# Patient Record
Sex: MALE | Race: WHITE | Employment: OTHER | URBAN - METROPOLITAN AREA
[De-identification: names, ages, dates, MRNs, and addresses within clinical notes are randomized per-mention and may not be internally consistent; named-entity substitution may affect disease eponyms.]

---

## 2017-01-04 ENCOUNTER — OFFICE VISIT (OUTPATIENT)
Dept: FAMILY MEDICINE CLINIC | Age: 72
End: 2017-01-04

## 2017-01-04 VITALS
SYSTOLIC BLOOD PRESSURE: 133 MMHG | DIASTOLIC BLOOD PRESSURE: 85 MMHG | HEIGHT: 73 IN | WEIGHT: 216 LBS | BODY MASS INDEX: 28.63 KG/M2 | OXYGEN SATURATION: 95 % | HEART RATE: 107 BPM | RESPIRATION RATE: 18 BRPM

## 2017-01-04 DIAGNOSIS — J01.20 ACUTE NON-RECURRENT ETHMOIDAL SINUSITIS: ICD-10-CM

## 2017-01-04 DIAGNOSIS — R73.02 IGT (IMPAIRED GLUCOSE TOLERANCE): ICD-10-CM

## 2017-01-04 DIAGNOSIS — J06.9 UPPER RESPIRATORY TRACT INFECTION, UNSPECIFIED TYPE: Primary | ICD-10-CM

## 2017-01-04 DIAGNOSIS — J30.9 ALLERGIC RHINITIS, UNSPECIFIED ALLERGIC RHINITIS TRIGGER, UNSPECIFIED RHINITIS SEASONALITY: ICD-10-CM

## 2017-01-04 LAB — HBA1C MFR BLD HPLC: 7.1 %

## 2017-01-04 RX ORDER — FLUTICASONE PROPIONATE 50 MCG
2 SPRAY, SUSPENSION (ML) NASAL DAILY
Qty: 1 BOTTLE | Refills: 2 | Status: SHIPPED | OUTPATIENT
Start: 2017-01-04 | End: 2017-11-02 | Stop reason: SDUPTHER

## 2017-01-04 RX ORDER — AMOXICILLIN AND CLAVULANATE POTASSIUM 875; 125 MG/1; MG/1
1 TABLET, FILM COATED ORAL 2 TIMES DAILY
Qty: 20 TAB | Refills: 0 | Status: SHIPPED | OUTPATIENT
Start: 2017-01-04 | End: 2018-12-31 | Stop reason: SDUPTHER

## 2017-01-04 NOTE — MR AVS SNAPSHOT
Visit Information Date & Time Provider Department Dept. Phone Encounter #  
 1/4/2017  1:15 PM Yfn Beltran, [address] [address] [phone] [id number] Follow-up Instructions Return if symptoms worsen or fail to improve. Upcoming Health Maintenance Date Due DTaP/Tdap/Td series (1 - Tdap) 9/28/1966 FOBT Q 1 YEAR AGE 50-75 7/17/2019* MEDICARE YEARLY EXAM 1/13/2017 GLAUCOMA SCREENING Q2Y 11/13/2017 *Topic was postponed. The date shown is not the original due date. Allergies as of 1/4/2017  Review Complete On: 1/4/2017 By: Yfn Beltran NP No Known Allergies Current Immunizations  Reviewed on 10/1/2013 Name Date Influenza High Dose Vaccine PF 10/1/2013, 1/11/2013 Influenza Vaccine Split 1/9/2012 Pneumococcal Conjugate (PCV-13) 1/13/2016 Pneumococcal Vaccine (Unspecified Type) 1/9/2012 Zoster Vaccine, Live 1/1/2014 Not reviewed this visit You Were Diagnosed With   
  
 Codes Comments Upper respiratory tract infection, unspecified type    -  Primary ICD-10-CM: J06.9 ICD-9-CM: 465.9 Acute non-recurrent ethmoidal sinusitis     ICD-10-CM: J01.20 ICD-9-CM: 461.2 Allergic rhinitis, unspecified allergic rhinitis trigger, unspecified rhinitis seasonality     ICD-10-CM: J30.9 ICD-9-CM: 477.9 Vitals BP Pulse Resp Height(growth percentile) Weight(growth percentile) SpO2  
 133/85 (BP 1 Location: Left arm, BP Patient Position: Sitting) (!) 107 18 6' 1\" (1.854 m) 216 lb (98 kg) 95% BMI Smoking Status 28.5 kg/m2 Former Smoker BMI and BSA Data Body Mass Index Body Surface Area 28.5 kg/m 2 2.25 m 2 Preferred Pharmacy Pharmacy Name Phone [facility] [address] [facility] [address] [facility] [phone] Your Updated Medication List  
  
   
This list is accurate as of: 1/4/17  2:37 PM.  Always use your most recent med list.  
  
  
  
  
 amLODIPine 5 mg tablet Commonly known as:  Marikaerika Barrios Take 1 Tab by mouth daily. amoxicillin-clavulanate 875-125 mg per tablet Commonly known as:  AUGMENTIN Take 1 Tab by mouth two (2) times a day for 10 days. aspirin 81 mg tablet Take 81 mg by mouth.  
  
 cilostazol 100 mg tablet Commonly known as:  PLETAL Take 1 Tab by mouth Before breakfast and dinner. fluticasone 50 mcg/actuation nasal spray Commonly known as:  Michaelyn Vishnu 2 Sprays by Both Nostrils route daily. lisinopril-hydroCHLOROthiazide 20-12.5 mg per tablet Commonly known as:  Mecca Edinger Take 1 Tab by mouth daily. * omega-3 fatty acids-vitamin e 1,000 mg Cap Take 1 Cap by mouth. * FISH OIL 1,000 mg Cap Generic drug:  omega-3 fatty acids-vitamin e Take 1 Cap by mouth.  
  
 potassium 99 mg tablet Take  by mouth.  
  
 pravastatin 10 mg tablet Commonly known as:  PRAVACHOL Take 1 Tab by mouth daily. * Notice: This list has 2 medication(s) that are the same as other medications prescribed for you. Read the directions carefully, and ask your doctor or other care provider to review them with you. Prescriptions Sent to Pharmacy Refills  
 amoxicillin-clavulanate (AUGMENTIN) 875-125 mg per tablet 0 Sig: Take 1 Tab by mouth two (2) times a day for 10 days. Class: Normal  
 Pharmacy: [address], [facility] [address] [facility] Ph #: [phone] Route: Oral  
 fluticasone (FLONASE) 50 mcg/actuation nasal spray 2 Si Sprays by Both Nostrils route daily. Class: Normal  
 Pharmacy: [address], [address] [facility] Ph #: [phone] Route: Both Nostrils Follow-up Instructions Return if symptoms worsen or fail to improve. Patient Instructions Also advised to use OTC nasal saline 2 sprays each nostril 2-3 times per day to assist with eustachian tube draining. Managing Your Allergies: Care Instructions Your Care Instructions Managing your allergies is an important part of staying healthy. Your doctor will help you find out what may be causing the allergies. Common causes of allergy symptoms are house dust and dust mites, animal dander, mold, and pollen. As soon as you know what triggers your symptoms, try to reduce your exposure to your triggers. This can help prevent allergy symptoms, asthma, and other health problems. Ask your doctor about allergy medicine or immunotherapy. These treatments may help reduce or prevent allergy symptoms. Follow-up care is a key part of your treatment and safety. Be sure to make and go to all appointments, and call your doctor if you are having problems. It's also a good idea to know your test results and keep a list of the medicines you take. How can you care for yourself at home? · If you think that dust or dust mites are causing your allergies: 
¨ Wash sheets, pillowcases, and other bedding every week in hot water. ¨ Use airtight, dust-proof covers for pillows, duvets, and mattresses. Avoid plastic covers, because they tend to tear quickly and do not \"breathe. \" Wash according to the instructions. ¨ Remove extra blankets and pillows that you don't need. ¨ Use blankets that are machine-washable. ¨ Don't use home humidifiers. They can help mites live longer. · Use air-conditioning. Change or clean all filters every month. Keep windows closed. Use high-efficiency air filters. Don't use window or attic fans, which draw dust into the air. · If you're allergic to pet dander, keep pets outside or, at the very least, out of your bedroom. Old carpet and cloth-covered furniture can hold a lot of animal dander. You may need to replace them. · Look for signs of cockroaches. Use cockroach baits to get rid of them. Then clean your home well.  
· If you're allergic to mold, don't keep indoor plants, because molds can grow in soil. Get rid of furniture, rugs, and drapes that smell musty. Check for mold in the bathroom. · If you're allergic to pollen, stay inside when pollen counts are high. · Don't smoke or let anyone else smoke in your house. Don't use fireplaces or wood-burning stoves. Avoid paint fumes, perfumes, and other strong odors. When should you call for help? Give an epinephrine shot if: 
· You think you are having a severe allergic reaction. · You have symptoms in more than one body area, such as mild nausea and an itchy mouth. After giving an epinephrine shot call 911, even if you feel better. Call 911 if: 
· You have symptoms of a severe allergic reaction. These may include: 
¨ Sudden raised, red areas (hives) all over your body. ¨ Swelling of the throat, mouth, lips, or tongue. ¨ Trouble breathing. ¨ Passing out (losing consciousness). Or you may feel very lightheaded or suddenly feel weak, confused, or restless. · You have been given an epinephrine shot, even if you feel better. Call your doctor now or seek immediate medical care if: 
· You have symptoms of an allergic reaction, such as: ¨ A rash or hives (raised, red areas on the skin). ¨ Itching. ¨ Swelling. ¨ Belly pain, nausea, or vomiting. Watch closely for changes in your health, and be sure to contact your doctor if: 
· Your allergies get worse. · You need help controlling your allergies. · You have questions about allergy testing. · You do not get better as expected. Where can you learn more? Go to http://clayton-tracey.info/. Enter L249 in the search box to learn more about \"Managing Your Allergies: Care Instructions. \" Current as of: February 12, 2016 Content Version: 11.1 © 5632-6445 [employer]. Care instructions adapted under license by [employer] (which disclaims liability or warranty for this information).  If you have questions about a medical condition or this instruction, always ask your healthcare professional. [address] any warranty or liability for your use of this information. Saline Nasal Washes: Care Instructions Your Care Instructions Saline nasal washes help keep the nasal passages open by washing out thick or dried mucus. This simple remedy can help relieve symptoms of allergies, sinusitis, and colds. It also can make the nose feel more comfortable by keeping the mucous membranes moist. You may notice a little burning sensation in your nose the first few times you use the solution, but this usually gets better in a few days. Follow-up care is a key part of your treatment and safety. Be sure to make and go to all appointments, and call your doctor if you are having problems. It's also a good idea to know your test results and keep a list of the medicines you take. How can you care for yourself at home? · You can buy premixed saline solution in a squeeze bottle or other sinus rinse products at a drugstore. Read and follow the instructions on the label. · You also can make your own saline solution by adding 1 teaspoon of salt and 1 teaspoon of baking soda to 2 cups of distilled water. · If you use a homemade solution, pour a small amount into a clean bowl. Using a rubber bulb syringe, squeeze the syringe and place the tip in the salt water. Pull a small amount of the salt water into the syringe by relaxing your hand. · Sit down with your head tilted slightly back. Do not lie down. Put the tip of the bulb syringe or the squeeze bottle a little way into one of your nostrils. Gently drip or squirt a few drops into the nostril. Repeat with the other nostril. Some sneezing and gagging are normal at first. 
· Gently blow your nose. · Wipe the syringe or bottle tip clean after each use. · Repeat this 2 or 3 times a day. · Use nasal washes gently if you have nosebleeds often. When should you call for help? Watch closely for changes in your health, and be sure to contact your doctor if: 
· You often get nosebleeds. · You have problems doing the nasal washes. Where can you learn more? Go to http://clayton-tracey.info/. Enter [id number] in the search box to learn more about \"Saline Nasal Washes: Care Instructions. \" Current as of: July 29, 2016 Content Version: 11.1 © 7530-9657 [employer]. Care instructions adapted under license by [employer] (which disclaims liability or warranty for this information). If you have questions about a medical condition or this instruction, always ask your healthcare professional. [address] [address] any warranty or liability for your use of this information. Sinusitis: Care Instructions Your Care Instructions Sinusitis is an infection of the lining of the sinus cavities in your head. Sinusitis often follows a cold. It causes pain and pressure in your head and face. In most cases, sinusitis gets better on its own in 1 to 2 weeks. But some mild symptoms may last for several weeks. Sometimes antibiotics are needed. Follow-up care is a key part of your treatment and safety. Be sure to make and go to all appointments, and call your doctor if you are having problems. It's also a good idea to know your test results and keep a list of the medicines you take. How can you care for yourself at home? · Take an over-the-counter pain medicine, such as acetaminophen (Tylenol), ibuprofen (Advil, Motrin), or naproxen (Aleve). Read and follow all instructions on the label. · If the doctor prescribed antibiotics, take them as directed. Do not stop taking them just because you feel better. You need to take the full course of antibiotics. · Be careful when taking over-the-counter cold or flu medicines and Tylenol at the same time.  Many of these medicines have acetaminophen, which is Tylenol. Read the labels to make sure that you are not taking more than the recommended dose. Too much acetaminophen (Tylenol) can be harmful. · Breathe warm, moist air from a steamy shower, a hot bath, or a sink filled with hot water. Avoid cold, dry air. Using a humidifier in your home may help. Follow the directions for cleaning the machine. · Use saline (saltwater) nasal washes to help keep your nasal passages open and wash out mucus and bacteria. You can buy saline nose drops at a grocery store or [employer]. Or you can make your own at home by adding 1 teaspoon of salt and 1 teaspoon of baking soda to 2 cups of distilled water. If you make your own, fill a bulb syringe with the solution, insert the tip into your nostril, and squeeze gently. Cristina Spindle your nose. · Put a hot, wet towel or a warm gel pack on your face 3 or 4 times a day for 5 to 10 minutes each time. · Try a decongestant nasal spray like oxymetazoline (Afrin). Do not use it for more than 3 days in a row. Using it for more than 3 days can make your congestion worse. When should you call for help? Call your doctor now or seek immediate medical care if: 
· You have new or worse swelling or redness in your face or around your eyes. · You have a new or higher fever. Watch closely for changes in your health, and be sure to contact your doctor if: 
· You have new or worse facial pain. · The mucus from your nose becomes thicker (like pus) or has new blood in it. · You are not getting better as expected. Where can you learn more? Go to http://clayton-tracey.info/. Enter W446 in the search box to learn more about \"Sinusitis: Care Instructions. \" Current as of: July 29, 2016 Content Version: 11.1 © 8742-3919 [employer]. Care instructions adapted under license by [employer] (which disclaims liability or warranty for this information).  If you have questions about a medical condition or this instruction, always ask your healthcare professional. [address] [address] any warranty or liability for your use of this information. Introducing hospitals & HEALTH SERVICES! Warren Quintero introduces [employer] patient portal. Now you can access parts of your medical record, email your doctor's office, and request medication refills online. 1. In your internet browser, go to [email or web address] 2. Click on the First Time User? Click Here link in the Sign In box. You will see the New Member Sign Up page. 3. Enter your [employer] Access Code exactly as it appears below. You will not need to use this code after youve completed the sign-up process. If you do not sign up before the expiration date, you must request a new code. · [employer] Access Code: [email or web address] Expires: 4/4/2017  2:35 PM 
 
4. Enter the last four digits of your Social Security Number (xxxx) and Date of Birth (mm/dd/yyyy) as indicated and click Submit. You will be taken to the next sign-up page. 5. Create a [employer] ID. This will be your [employer] login ID and cannot be changed, so think of one that is secure and easy to remember. 6. Create a [employer] password. You can change your password at any time. 7. Enter your Password Reset Question and Answer. This can be used at a later time if you forget your password. 8. Enter your e-mail address. You will receive e-mail notification when new information is available in [address]. 9. Click Sign Up. You can now view and download portions of your medical record. 10. Click the Download Summary menu link to download a portable copy of your medical information. If you have questions, please visit the Frequently Asked Questions section of the [employer] website. Remember, [employer] is NOT to be used for urgent needs. For medical emergencies, dial 911. Now available from your iPhone and Android! Please provide this summary of care documentation to your next provider. Your primary care clinician is listed as [address] If you have any questions after today's visit, please call [phone].

## 2017-01-04 NOTE — PATIENT INSTRUCTIONS
Also advised to use OTC nasal saline 2 sprays each nostril 2-3 times per day to assist with eustachian tube draining. Managing Your Allergies: Care Instructions  Your Care Instructions  Managing your allergies is an important part of staying healthy. Your doctor will help you find out what may be causing the allergies. Common causes of allergy symptoms are house dust and dust mites, animal dander, mold, and pollen. As soon as you know what triggers your symptoms, try to reduce your exposure to your triggers. This can help prevent allergy symptoms, asthma, and other health problems. Ask your doctor about allergy medicine or immunotherapy. These treatments may help reduce or prevent allergy symptoms. Follow-up care is a key part of your treatment and safety. Be sure to make and go to all appointments, and call your doctor if you are having problems. It's also a good idea to know your test results and keep a list of the medicines you take. How can you care for yourself at home? · If you think that dust or dust mites are causing your allergies:  ¨ Wash sheets, pillowcases, and other bedding every week in hot water. ¨ Use airtight, dust-proof covers for pillows, duvets, and mattresses. Avoid plastic covers, because they tend to tear quickly and do not \"breathe. \" Wash according to the instructions. ¨ Remove extra blankets and pillows that you don't need. ¨ Use blankets that are machine-washable. ¨ Don't use home humidifiers. They can help mites live longer. · Use air-conditioning. Change or clean all filters every month. Keep windows closed. Use high-efficiency air filters. Don't use window or attic fans, which draw dust into the air. · If you're allergic to pet dander, keep pets outside or, at the very least, out of your bedroom. Old carpet and cloth-covered furniture can hold a lot of animal dander. You may need to replace them. · Look for signs of cockroaches. Use cockroach baits to get rid of them. Then clean your home well. · If you're allergic to mold, don't keep indoor plants, because molds can grow in soil. Get rid of furniture, rugs, and drapes that smell musty. Check for mold in the bathroom. · If you're allergic to pollen, stay inside when pollen counts are high. · Don't smoke or let anyone else smoke in your house. Don't use fireplaces or wood-burning stoves. Avoid paint fumes, perfumes, and other strong odors. When should you call for help? Give an epinephrine shot if:  · You think you are having a severe allergic reaction. · You have symptoms in more than one body area, such as mild nausea and an itchy mouth. After giving an epinephrine shot call 911, even if you feel better. Call 911 if:  · You have symptoms of a severe allergic reaction. These may include:  ¨ Sudden raised, red areas (hives) all over your body. ¨ Swelling of the throat, mouth, lips, or tongue. ¨ Trouble breathing. ¨ Passing out (losing consciousness). Or you may feel very lightheaded or suddenly feel weak, confused, or restless. · You have been given an epinephrine shot, even if you feel better. Call your doctor now or seek immediate medical care if:  · You have symptoms of an allergic reaction, such as:  ¨ A rash or hives (raised, red areas on the skin). ¨ Itching. ¨ Swelling. ¨ Belly pain, nausea, or vomiting. Watch closely for changes in your health, and be sure to contact your doctor if:  · Your allergies get worse. · You need help controlling your allergies. · You have questions about allergy testing. · You do not get better as expected. Where can you learn more? Go to http://clayton-tracey.info/. Enter L249 in the search box to learn more about \"Managing Your Allergies: Care Instructions. \"  Current as of: February 12, 2016  Content Version: 11.1  © 6527-4085 [employer].  Care instructions adapted under license by [employer] (which disclaims liability or warranty for this information). If you have questions about a medical condition or this instruction, always ask your healthcare professional. [address] any warranty or liability for your use of this information. Saline Nasal Washes: Care Instructions  Your Care Instructions  Saline nasal washes help keep the nasal passages open by washing out thick or dried mucus. This simple remedy can help relieve symptoms of allergies, sinusitis, and colds. It also can make the nose feel more comfortable by keeping the mucous membranes moist. You may notice a little burning sensation in your nose the first few times you use the solution, but this usually gets better in a few days. Follow-up care is a key part of your treatment and safety. Be sure to make and go to all appointments, and call your doctor if you are having problems. It's also a good idea to know your test results and keep a list of the medicines you take. How can you care for yourself at home? · You can buy premixed saline solution in a squeeze bottle or other sinus rinse products at a drugstore. Read and follow the instructions on the label. · You also can make your own saline solution by adding 1 teaspoon of salt and 1 teaspoon of baking soda to 2 cups of distilled water. · If you use a homemade solution, pour a small amount into a clean bowl. Using a rubber bulb syringe, squeeze the syringe and place the tip in the salt water. Pull a small amount of the salt water into the syringe by relaxing your hand. · Sit down with your head tilted slightly back. Do not lie down. Put the tip of the bulb syringe or the squeeze bottle a little way into one of your nostrils. Gently drip or squirt a few drops into the nostril. Repeat with the other nostril. Some sneezing and gagging are normal at first.  · Gently blow your nose. · Wipe the syringe or bottle tip clean after each use. · Repeat this 2 or 3 times a day.   · Use nasal washes gently if you have nosebleeds often. When should you call for help? Watch closely for changes in your health, and be sure to contact your doctor if:  · You often get nosebleeds. · You have problems doing the nasal washes. Where can you learn more? Go to http://clayton-tracey.info/. Enter [id number] in the search box to learn more about \"Saline Nasal Washes: Care Instructions. \"  Current as of: July 29, 2016  Content Version: 11.1  © 6813-5848 [employer]. Care instructions adapted under license by [employer] (which disclaims liability or warranty for this information). If you have questions about a medical condition or this instruction, always ask your healthcare professional. [address] any warranty or liability for your use of this information. Sinusitis: Care Instructions  Your Care Instructions    Sinusitis is an infection of the lining of the sinus cavities in your head. Sinusitis often follows a cold. It causes pain and pressure in your head and face. In most cases, sinusitis gets better on its own in 1 to 2 weeks. But some mild symptoms may last for several weeks. Sometimes antibiotics are needed. Follow-up care is a key part of your treatment and safety. Be sure to make and go to all appointments, and call your doctor if you are having problems. It's also a good idea to know your test results and keep a list of the medicines you take. How can you care for yourself at home? · Take an over-the-counter pain medicine, such as acetaminophen (Tylenol), ibuprofen (Advil, Motrin), or naproxen (Aleve). Read and follow all instructions on the label. · If the doctor prescribed antibiotics, take them as directed. Do not stop taking them just because you feel better. You need to take the full course of antibiotics. · Be careful when taking over-the-counter cold or flu medicines and Tylenol at the same time.  Many of these medicines have acetaminophen, which is Tylenol. Read the labels to make sure that you are not taking more than the recommended dose. Too much acetaminophen (Tylenol) can be harmful. · Breathe warm, moist air from a steamy shower, a hot bath, or a sink filled with hot water. Avoid cold, dry air. Using a humidifier in your home may help. Follow the directions for cleaning the machine. · Use saline (saltwater) nasal washes to help keep your nasal passages open and wash out mucus and bacteria. You can buy saline nose drops at a grocery store or drugstore. Or you can make your own at home by adding 1 teaspoon of salt and 1 teaspoon of baking soda to 2 cups of distilled water. If you make your own, fill a bulb syringe with the solution, insert the tip into your nostril, and squeeze gently. Stuart Jerman your nose. · Put a hot, wet towel or a warm gel pack on your face 3 or 4 times a day for 5 to 10 minutes each time. · Try a decongestant nasal spray like oxymetazoline (Afrin). Do not use it for more than 3 days in a row. Using it for more than 3 days can make your congestion worse. When should you call for help? Call your doctor now or seek immediate medical care if:  · You have new or worse swelling or redness in your face or around your eyes. · You have a new or higher fever. Watch closely for changes in your health, and be sure to contact your doctor if:  · You have new or worse facial pain. · The mucus from your nose becomes thicker (like pus) or has new blood in it. · You are not getting better as expected. Where can you learn more? Go to http://clayton-tracey.info/. Enter U805 in the search box to learn more about \"Sinusitis: Care Instructions. \"  Current as of: July 29, 2016  Content Version: 11.1  © 3179-9434 [email or web address]. Care instructions adapted under license by [employer] (which disclaims liability or warranty for this information).  If you have questions about a medical condition or this instruction, always ask your healthcare professional. [place] [address] any warranty or liability for your use of this information.

## 2017-01-04 NOTE — PROGRESS NOTES
Subjective:     Chief Complaint   Patient presents with   Johnny Nohemy Eyes       Toya Dunbar is a 66 y.o. male who complains of congestion, itchy and watery eyes, sore throat, swollen glands, headache and bilateral sinus pain and some chest discomfort \"like I cant get enough air in at times but I am not really short of breath\" and does note that he was a smoker, quit several years ago. Symptoms have been ongoing for 3-4 weeks. He denies a history of shortness of breath and wheezing. Denies fever, chest pain, dizziness. Tried OTC cold remedies (tried a decongestant but felt dehydrated)  with temporary relief. Patient does not smoke cigarettes. .   ROS is otherwise negative. No evaluation to date. No recent travel. Sick Contacts- yes, family    FLU VACCINE- UTD  Pneumonia Vaccine- UTD  Chart reviewed: immunizations are up to date and documented. Past Medical History   Diagnosis Date    Hypercholesterolemia     Hypertension      Social History   Substance Use Topics    Smoking status: Former Smoker     Quit date: 3/1/2003    Smokeless tobacco: Never Used    Alcohol use None     Current Outpatient Prescriptions on File Prior to Visit   Medication Sig Dispense Refill    cilostazol (PLETAL) 100 mg tablet Take 1 Tab by mouth Before breakfast and dinner. 180 Tab 3    lisinopril-hydrochlorothiazide (PRINZIDE, ZESTORETIC) 20-12.5 mg per tablet Take 1 Tab by mouth daily. 90 Tab 3    amLODIPine (NORVASC) 5 mg tablet Take 1 Tab by mouth daily. (Patient taking differently: Take 2.5 mg by mouth daily.) 90 Tab 3    pravastatin (PRAVACHOL) 10 mg tablet Take 1 Tab by mouth daily. 90 Tab 3    aspirin 81 mg tablet Take 81 mg by mouth.  potassium 99 mg Tab Take  by mouth.  omega-3 fatty acids-vitamin e (FISH OIL) 1,000 mg cap Take 1 Cap by mouth.  omega-3 fatty acids-vitamin e 1,000 mg cap Take 1 Cap by mouth.       amoxicillin-clavulanate (AUGMENTIN) 875-125 mg per tablet Take by mouth every twelve (12) hours. No current facility-administered medications on file prior to visit. No Known Allergies     Review of Systems  Pertinent items are noted in HPI. Agree with nurses note. OBJECTIVE:   Visit Vitals    /85 (BP 1 Location: Left arm, BP Patient Position: Sitting)    Pulse (!) 107    Resp 18    Ht 6' 1\" (1.854 m)    Wt 216 lb (98 kg)    SpO2 95%    BMI 28.5 kg/m2     He appears well, vital signs are as noted above   HEAD:  Normocephalic. Atraumatic.  +tender sinuses x 4 frontal and ethmoid. EYE: PERRLA. EOMs intact. Bilateral Sclera injected and watery. No discharge. Conjunctiva normal.  EARS: Hearing intact bilaterally. External ear canals normal without evidence of blood or swelling. Bilateral TM's intact, pearly grey with landmarks visible. No erythema or effusion. + clear fluid bilateral  NOSE: Patent. Nasal turbinates pink. No polyps noted. Mild erythema. Clear discharge. MOUTH: mucous membranes pink and moist. Posterior pharynx normal with cobblestone appearance. No erythema, white exudate or obstruction. NECK: supple. Midline trachea. RESP: Breath sounds are symmetrical bilaterally. Unlabored without SOB. Speaking in full sentences. Clear to auscultation bilaterally anteriorly and posteriorly. No wheezes. No rales or rhonchi. Non-productive cough when elicited. CV: normal rate. Regular rhythm. S1, S2 audible. No murmur noted. No rubs, clicks or gallops noted. HEME/LYMPH: peripheral pulses palpable 2+ x 4 extremities. No peripheral edema is noted. No cervical adenopathy noted. SKIN: clean dry and intact throughout. no rashes, erythema, ecchymosis, lacerations, abrasions, suspicious moles noted        Assessment/Plan:   Differential diagnosis and treatment options reviewed with patient who is in agreement with treatment plan as outlined below. ICD-10-CM ICD-9-CM    1.  Upper respiratory tract infection, unspecified type J06.9 465.9 amoxicillin-clavulanate (AUGMENTIN) 875-125 mg per tablet   2. Acute non-recurrent ethmoidal sinusitis J01.20 461.2 amoxicillin-clavulanate (AUGMENTIN) 875-125 mg per tablet   3. Allergic rhinitis, unspecified allergic rhinitis trigger, unspecified rhinitis seasonality J30.9 477.9 fluticasone (FLONASE) 50 mcg/actuation nasal spray     Start flonase daily and prescribed Augmentin for sinusitis. Medication profile discussed with patient. Symptomatic therapy suggested: push fluids, rest, gargle warm salt water and apply heat to sinuses prn. Lack of antibiotic effectiveness discussed with him. Alternate Ibuprofen with Tylenol every 4 hours as needed for pain and discomfort. OTC nasal saline spray  2-3 sprays per nostril twice a day to clear eustachian tube and assist with post nasal drip symptoms. OTC antihistamines (Zyrtec or Claritin)  to reduce allergic symptoms such as sneezing, runny nose and itchy eyes. OTC Mucinex per box instructions  Encouraged nutrition, hydration and rest; -avoid dairy, sugar and strenuous activity    Verbal and written instructions (see AVS) provided. Patient expresses understanding and agreement of diagnosis and treatment plan.     F/u if symptoms do not improve or worsen

## 2017-01-04 NOTE — PROGRESS NOTES
Pt here for having watery eyes and sneezing for 1 month. Also has mucus in throat,\" knot in throat. \" And pt complains of increase of shortness when climbing 13 steps,chest discomfort all  for 1 month. And head congestion. Health maintance reviewed,not sure if Dtap done with 10 years.

## 2017-01-17 ENCOUNTER — TELEPHONE (OUTPATIENT)
Dept: FAMILY MEDICINE CLINIC | Age: 72
End: 2017-01-17

## 2017-03-20 ENCOUNTER — OFFICE VISIT (OUTPATIENT)
Dept: FAMILY MEDICINE CLINIC | Age: 72
End: 2017-03-20

## 2017-03-20 ENCOUNTER — HOSPITAL ENCOUNTER (OUTPATIENT)
Dept: LAB | Age: 72
Discharge: HOME | End: 2017-03-20
Payer: MEDICARE

## 2017-03-20 VITALS
OXYGEN SATURATION: 93 % | TEMPERATURE: 98 F | HEIGHT: 73 IN | DIASTOLIC BLOOD PRESSURE: 84 MMHG | HEART RATE: 86 BPM | WEIGHT: 213 LBS | SYSTOLIC BLOOD PRESSURE: 141 MMHG | RESPIRATION RATE: 18 BRPM | BODY MASS INDEX: 28.23 KG/M2

## 2017-03-20 DIAGNOSIS — Z00.00 ROUTINE GENERAL MEDICAL EXAMINATION AT A HEALTH CARE FACILITY: Primary | ICD-10-CM

## 2017-03-20 DIAGNOSIS — Z12.5 SPECIAL SCREENING FOR MALIGNANT NEOPLASM OF PROSTATE: ICD-10-CM

## 2017-03-20 DIAGNOSIS — F17.210 SMOKING GREATER THAN 30 PACK YEARS: ICD-10-CM

## 2017-03-20 DIAGNOSIS — I73.9 PAD (PERIPHERAL ARTERY DISEASE) (HCC): ICD-10-CM

## 2017-03-20 DIAGNOSIS — Z23 ENCOUNTER FOR IMMUNIZATION: ICD-10-CM

## 2017-03-20 DIAGNOSIS — R73.02 IGT (IMPAIRED GLUCOSE TOLERANCE): ICD-10-CM

## 2017-03-20 DIAGNOSIS — Z13.39 SCREENING FOR ALCOHOLISM: ICD-10-CM

## 2017-03-20 DIAGNOSIS — N42.9 DISORDER OF PROSTATE: ICD-10-CM

## 2017-03-20 PROCEDURE — 80053 COMPREHEN METABOLIC PANEL: CPT

## 2017-03-20 PROCEDURE — 84153 ASSAY OF PSA TOTAL: CPT

## 2017-03-20 PROCEDURE — 36415 COLL VENOUS BLD VENIPUNCTURE: CPT

## 2017-03-20 PROCEDURE — 83036 HEMOGLOBIN GLYCOSYLATED A1C: CPT

## 2017-03-20 NOTE — MR AVS SNAPSHOT
Visit Information Date & Time Provider Department Dept. Phone Encounter #  
 3/20/2017  8:00 AM Salty Oconnell MD [address] [facility] [phone] [id number] Upcoming Health Maintenance Date Due DTaP/Tdap/Td series (1 - Tdap) 9/28/1966 MEDICARE YEARLY EXAM 1/13/2017 FOBT Q 1 YEAR AGE 50-75 7/17/2019* GLAUCOMA SCREENING Q2Y 11/13/2017 *Topic was postponed. The date shown is not the original due date. Allergies as of 3/20/2017  Review Complete On: 3/20/2017 By: Salty Oconnell MD  
 No Known Allergies Current Immunizations  Reviewed on 10/1/2013 Name Date Influenza High Dose Vaccine PF 10/1/2013, 1/11/2013 Influenza Vaccine Split 1/9/2012 Pneumococcal Conjugate (PCV-13) 1/13/2016 Pneumococcal Vaccine (Unspecified Type) 1/9/2012 Zoster Vaccine, Live 1/1/2014 Not reviewed this visit You Were Diagnosed With   
  
 Codes Comments Routine general medical examination at a health care facility    -  Primary ICD-10-CM: Z00.00 ICD-9-CM: V70.0 Screening for alcoholism     ICD-10-CM: Z13.89 ICD-9-CM: V79.1 Smoking greater than 30 pack years     ICD-10-CM: F17.210 ICD-9-CM: 305.1 Special screening for malignant neoplasm of prostate     ICD-10-CM: Z12.5 ICD-9-CM: V76.44 IGT (impaired glucose tolerance)     ICD-10-CM: R73.02 
ICD-9-CM: 790.22 PAD (peripheral artery disease) (HCC)     ICD-10-CM: I73.9 ICD-9-CM: 443.9 Encounter for immunization     ICD-10-CM: M09 ICD-9-CM: V03.89 Disorder of prostate     ICD-10-CM: N42.9 ICD-9-CM: 602.9 Vitals BP Pulse Temp Resp Height(growth percentile) Weight(growth percentile) 141/84 (BP 1 Location: Left arm, BP Patient Position: Sitting) 86 98 °F (36.7 °C) 18 6' 1\" (1.854 m) 213 lb (96.6 kg) SpO2 BMI Smoking Status 93% 28.1 kg/m2 Former Smoker Vitals History BMI and BSA Data  Body Mass Index Body Surface Area  
 28.1 kg/m 2 2.23 m 2  
 Preferred Pharmacy Pharmacy Name Phone [facility]  [facility], [address] [phone] Your Updated Medication List  
  
   
This list is accurate as of: 3/20/17  8:45 AM.  Always use your most recent med list. amLODIPine 5 mg tablet Commonly known as:  Jasmin Margareth Take 1 Tab by mouth daily. aspirin 81 mg tablet Take 81 mg by mouth.  
  
 cilostazol 100 mg tablet Commonly known as:  PLETAL Take 1 Tab by mouth Before breakfast and dinner. diph,pertuss(acel),tetanus vac(PF) 2 Lf-(2.5-5-3-5 mcg)-5Lf/0.5 mL Syrg vaccine Commonly known as:  ADACEL  
0.5 mL by IntraMUSCular route once for 1 dose. fluticasone 50 mcg/actuation nasal spray Commonly known as:  Marta Jumper 2 Sprays by Both Nostrils route daily. lisinopril-hydroCHLOROthiazide 20-12.5 mg per tablet Commonly known as:  Birder Haste Take 1 Tab by mouth daily. pravastatin 10 mg tablet Commonly known as:  PRAVACHOL Take 1 Tab by mouth daily. Prescriptions Printed Refills diph,pertuss,acel,,tetanus vac,PF, (ADACEL) 2 Lf-(2.5-5-3-5 mcg)-5Lf/0.5 mL syrg vaccine 0 Si.5 mL by IntraMUSCular route once for 1 dose. Class: Print Route: IntraMUSCular We Performed the Following HEMOGLOBIN A1C WITH EAG [32250 CPT(R)] METABOLIC PANEL, COMPREHENSIVE [86188 CPT(R)] [place] ANNUAL ALCOHOL SCREEN 15 MIN [id number] [facility]] PROSTATE SPECIFIC AG (PSA) [id number] CPT(R)] To-Do List   
 2017 Imaging:  CT LOW DOSE LUNG CANCER SCREENING Patient Instructions Medicare Part B Preventive Services Limitations Recommendation Scheduled Bone Mass Measurement 
(age 72 & older, biennial) Requires diagnosis related to osteoporosis or estrogen deficiency. Biennial benefit unless patient has history of long-term glucocorticoid tx or baseline is needed because initial test was by other method Cardiovascular Screening Blood Tests (every 5 years) Total cholesterol, HDL, Triglycerides Order as a panel if possible Colorectal Cancer Screening 
-Fecal occult blood test (annual) -Flexible sigmoidoscopy (5y) 
-Screening colonoscopy (10y) -Barium Enema Counseling to Prevent Tobacco Use (up to 8 sessions per year) - Counseling greater than 3 and up to 10 minutes - Counseling greater than 10 minutes Patients must be asymptomatic of tobacco-related conditions to receive as preventive service Diabetes Screening Tests (at least every 3 years, Medicare covers annually or at 6-month intervals for prediabetic patients) Fasting blood sugar (FBS) or glucose tolerance test (GTT) Patient must be diagnosed with one of the following: 
-Hypertension, Dyslipidemia, obesity, previous impaired FBS or GTT 
Or any two of the following: overweight, FH of diabetes, age ? 72, history of gestational diabetes, birth of baby weighing more than 9 pounds Diabetes Self-Management Training (DSMT) (no USPSTF recommendation) Requires referral by treating physician for patient with diabetes or renal disease. 10 hours of initial DSMT session of no less than 30 minutes each in a continuous 12-month period. 2 hours of follow-up DSMT in subsequent years. Glaucoma Screening (no USPSTF recommendation) Diabetes mellitus, family history, , age 48 or over,  American, age 72 or over Human Immunodeficiency Virus (HIV) Screening (annually for increased risk patients) HIV-1 and HIV-2 by EIA, WALLACE, rapid antibody test, or oral mucosa transudate Patient must be at increased risk for HIV infection per USPSTF guidelines or pregnant. Tests covered annually for patients at increased risk. Pregnant patients may receive up to 3 test during pregnancy.     
Medical Nutrition Therapy (MNT) (for diabetes or renal disease not recommended schedule) Requires referral by treating physician for patient with diabetes or renal disease. Can be provided in same year as diabetes self-management training (DSMT), and CMS recommends medical nutrition therapy take place after DSMT. Up to 3 hours for initial year and 2 hours in subsequent years. Prostate Cancer Screening (annually up to age 76) - Digital rectal exam (MARTÍNEZ) - Prostate specific antigen (PSA) Annually (age 48 or over), MARTÍNEZ not paid separately when covered E/M service is provided on same date Seasonal Influenza Vaccination (annually) Pneumococcal Vaccination (once after 65) Hepatitis B Vaccinations (if medium/high risk) Medium/high risk factors:  End-stage renal disease, Hemophiliacs who received Factor VIII or IX concentrates, Clients of institutions for the mentally retarded, Persons who live in the same house as a HepB virus carrier, Homosexual men, Illicit injectable drug abusers. Screening Mammography (biennial age 54-69)? Annually (age 36 or over) Screening Pap Tests and Pelvic Examination (up to age 79 and after 79 if unknown history or abnormal study last 10 years) Every 24 months except high risk Ultrasound Screening for Abdominal Aortic Aneurysm (AAA) (once) Patient must be referred through IPPE and not have had a screening for abdominal aortic aneurysm before under Medicare. Limited to patients who meet one of the following criteria: 
- Men who are 73-68 years old and have smoked more than 100 cigarettes in their lifetime. 
-Anyone with a FH of AAA 
-Anyone recommended for screening by USPSTF Family Practice Management 2011 Health Maintenance Due Topic Date Due  
 DTaP/Tdap/Td  (1 - Tdap) 09[birth year]28/1966 Verlinda Smoker Annual Well Visit  01/13/2017 Introducing [facility]! Leanne Davis introduces [employer] patient portal. Now you can access parts of your medical record, email your doctor's office, and request medication refills online.    
 
1. In your internet browser, go to [email or web address] 2. Click on the First Time User? Click Here link in the Sign In box. You will see the New Member Sign Up page. 3. Enter your [employer] Access Code exactly as it appears below. You will not need to use this code after youve completed the sign-up process. If you do not sign up before the expiration date, you must request a new code. · [employer] Access Code: [email or web address] Expires: 4/4/2017  3:35 PM 
 
4. Enter the last four digits of your Social Security Number (xxxx) and Date of Birth (mm/dd/yyyy) as indicated and click Submit. You will be taken to the next sign-up page. 5. Create a [employer]t ID. This will be your [employer] login ID and cannot be changed, so think of one that is secure and easy to remember. 6. Create a [employer] password. You can change your password at any time. 7. Enter your Password Reset Question and Answer. This can be used at a later time if you forget your password. 8. Enter your e-mail address. You will receive e-mail notification when new information is available in [address] 9. Click Sign Up. You can now view and download portions of your medical record. 10. Click the Download Summary menu link to download a portable copy of your medical information. If you have questions, please visit the Frequently Asked Questions section of the [employer] website. Remember, [employer] is NOT to be used for urgent needs. For medical emergencies, dial 911. Now available from your iPhone and Android! Please provide this summary of care documentation to your next provider. Your primary care clinician is listed as Kymberly Oliva. If you have any questions after today's visit, please call [phone].

## 2017-03-20 NOTE — PROGRESS NOTES
This is a Subsequent Medicare Annual Wellness Visit providing Personalized Prevention Plan Services (PPPS)     I have reviewed the patient's medical history in detail and updated the computerized patient record. History   Zane Fleischer is a 66 y.o. male who presents for his wellness exam.  I am seen him for the first time. Tells me that he has been having a problem with his blood sugar over the last few years. A1c was 7.0 a few years ago he has been getting it checked periodically since then. Has been doing pretty good but had an A1c of 7.42 months ago after Thanksgiving and Yanna. Does not eat sugar on a daily basis, it appears that he drinks a V8 daily and has potatoes several times a week. Provided education on starch. Sees a vascular surgeon for peripheral artery disease, just had a scan that revealed mild carotid artery stenosis but otherwise doing good. She is a dermatologist for actinic keratosis. Has had a problem with congestion and drainage since November has not tried any OTC remedies for this. The problem is bilateral    Past Medical History:   Diagnosis Date    Hypercholesterolemia     Hypertension       Past Surgical History:   Procedure Laterality Date    CARDIAC SURG PROCEDURE UNLIST  2003    aortobifemoral bypass      Current Outpatient Prescriptions   Medication Sig Dispense Refill    diph,pertuss,acel,,tetanus vac,PF, (ADACEL) 2 Lf-(2.5-5-3-5 mcg)-5Lf/0.5 mL syrg vaccine 0.5 mL by IntraMUSCular route once for 1 dose. 0.5 mL 0    fluticasone (FLONASE) 50 mcg/actuation nasal spray 2 Sprays by Both Nostrils route daily. 1 Bottle 2    cilostazol (PLETAL) 100 mg tablet Take 1 Tab by mouth Before breakfast and dinner. 180 Tab 3    lisinopril-hydrochlorothiazide (PRINZIDE, ZESTORETIC) 20-12.5 mg per tablet Take 1 Tab by mouth daily. 90 Tab 3    amLODIPine (NORVASC) 5 mg tablet Take 1 Tab by mouth daily.  (Patient taking differently: Take 2.5 mg by mouth daily.) 90 Tab 3  pravastatin (PRAVACHOL) 10 mg tablet Take 1 Tab by mouth daily. 90 Tab 3    aspirin 81 mg tablet Take 81 mg by mouth. No Known Allergies  No family history on file. Social History   Substance Use Topics    Smoking status: Former Smoker     Quit date: 3/1/2003    Smokeless tobacco: Never Used    Alcohol use Not on file     Patient Active Problem List   Diagnosis Code    Hyperlipidemia E78.5    Hypertension I10    PAD (peripheral artery disease) ([facility]) I73.9    Enlarged prostate N40.0    IGT (impaired glucose tolerance) R73.02       Depression Risk Factor Screening:     PHQ 2 / 9, over the last two weeks 3/20/2017   Little interest or pleasure in doing things Not at all   Feeling down, depressed or hopeless Not at all   Total Score PHQ 2 0     Alcohol Risk Factor Screening: On any occasion during the past 3 months, have you had more than 4 drinks containing alcohol? No    Do you average more than 14 drinks per week? No      Functional Ability and Level of Safety:     Hearing Loss   none    Activities of Daily Living   Self-care. Requires assistance with: no ADLs    Fall Risk     Fall Risk Assessment, last 12 mths 3/20/2017   Able to walk? Yes   Fall in past 12 months? No     Abuse Screen   Patient is not abused    Review of Systems   ROS:  As listed in HPI. In addition:  Constitutional:   No headache, fever, fatigue, weight loss or weight gain      Eyes:   No redness, pruritis, pain, visual changes, swelling, or discharge      Ears:    No pain, loss or changes in hearing     Cardiac:    No chest pain      Resp:   No cough or shortness of breath      Neuro   No loss of consciousness, dizziness, seizure    Physical Examination     Evaluation of Cognitive Function:  Mood/affect:  happy  Appearance: age appropriate  Family member/caregiver input: Not available    Physical Exam:  Blood pressure 141/84, pulse 86, temperature 98 °F (36.7 °C), resp.  rate 18, height 6' 1\" (1.854 m), weight 213 lb (96.6 kg), SpO2 93 %. GEN: No apparent distress. Alert and oriented and responds to all questions appropriately. EYES:  Conjunctiva clear; pupils round and reactive to light; extraocular movements are intact. EAR: External ears are normal.  The ear canals have a single anterior polyp on the right side and 2 anterior polyps on the left side. Patient is never been told that he had this before. They are small, clear, smooth. Tympanic membranes are clear and without effusion. NOSE: Turbinates are mildly congested   OROPHYARYNX: No oral lesions or exudates. NECK:  Supple; no masses; thyroid normal           LUNGS: Respirations unlabored; clear to auscultation bilaterally  CARDIOVASCULAR: Regular, rate, and rhythm without murmurs   ABDOMEN: Soft; nontender; nondistended; normoactive bowel sounds; no masses or organomegaly  NEUROLOGIC:  No focal neurologic deficits. Strength and sensation grossly intact. Coordination and gait grossly intact. EXT: Well perfused. No edema. SKIN: No obvious rashes. Patient Care Team:  Fortunato Elaine MD as PCP - [facility])    Advice/Referrals/Counseling   Education and counseling provided:    Already taking an aspirin a day    Add vitamin D3 daily    Former smoker, has already gotten the abdominal aorta taking care of, vascular graft. Annual surveillance with vascular surgeon    Has never had lung cancer screening, greater than 40 pack years, quit in 2003. Will have 2 years or so that we can screen him. Had a colonoscopy 2014 with Dr. Brenda Cobb, track down these records    No family history of prostate cancer but he would like a PSA    Due for Tdap    Advance care planning, has all this set up. Bring in to be scanned    Assessment/Plan     Medicare wellness    Health maintenance as above    Impaired glucose tolerance, actually technically diabetes in the past but will give him the benefit for now.   Provided education on diet, there are some areas for improvement such as frequent potatoes and daily V8 juice. Congestion, provided education handout on OTC allergy remedies, suggested nasal steroid    Addendum: Labs reviewed. A1c is 7.9. Elevated out of proportion to what I would have expected for given the discussion we had about his diet. We need to discuss starting metformin 1000 mg twice daily in addition to the diet modifications we talked      ICD-10-CM ICD-9-CM    1. Routine general medical examination at a health care facility Z00.00 V70.0    2. Screening for alcoholism Z13.89 V79.1 [place] ANNUAL ALCOHOL SCREEN 15 MIN   3. Smoking greater than 30 pack years F17.210 305.1 CT LOW DOSE LUNG CANCER SCREENING   4. Special screening for malignant neoplasm of prostate Z12.5 V76.44 PROSTATE SPECIFIC AG (PSA)   5. IGT (impaired glucose tolerance) R73.02 790.22 HEMOGLOBIN A1C WITH EAG   6. PAD (peripheral artery disease) (HCC) H59.3 856.8 METABOLIC PANEL, COMPREHENSIVE   7. Encounter for immunization Z23 V03.89 diph,pertuss,acel,,tetanus vac,PF, (ADACEL) 2 Lf-(2.5-5-3-5 mcg)-5Lf/0.5 mL syrg vaccine   8.  Disorder of prostate  N42.9 602.9 PROSTATE SPECIFIC AG (PSA)

## 2017-03-20 NOTE — PATIENT INSTRUCTIONS
Medicare Part B Preventive Services Limitations Recommendation Scheduled   Bone Mass Measurement  (age 72 & older, biennial) Requires diagnosis related to osteoporosis or estrogen deficiency. Biennial benefit unless patient has history of long-term glucocorticoid tx or baseline is needed because initial test was by other method     Cardiovascular Screening Blood Tests (every 5 years)  Total cholesterol, HDL, Triglycerides Order as a panel if possible     Colorectal Cancer Screening  -Fecal occult blood test (annual)  -Flexible sigmoidoscopy (5y)  -Screening colonoscopy (10y)  -Barium Enema      Counseling to Prevent Tobacco Use (up to 8 sessions per year)  - Counseling greater than 3 and up to 10 minutes  - Counseling greater than 10 minutes Patients must be asymptomatic of tobacco-related conditions to receive as preventive service     Diabetes Screening Tests (at least every 3 years, Medicare covers annually or at 6-month intervals for prediabetic patients)    Fasting blood sugar (FBS) or glucose tolerance test (GTT) Patient must be diagnosed with one of the following:  -Hypertension, Dyslipidemia, obesity, previous impaired FBS or GTT  Or any two of the following: overweight, FH of diabetes, age ? 72, history of gestational diabetes, birth of baby weighing more than 9 pounds     Diabetes Self-Management Training (DSMT) (no USPSTF recommendation) Requires referral by treating physician for patient with diabetes or renal disease. 10 hours of initial DSMT session of no less than 30 minutes each in a continuous 12-month period. 2 hours of follow-up DSMT in subsequent years.      Glaucoma Screening (no USPSTF recommendation) Diabetes mellitus, family history, , age 48 or over,  American, age 72 or over     Human Immunodeficiency Virus (HIV) Screening (annually for increased risk patients)  HIV-1 and HIV-2 by EIA, WALLACE, rapid antibody test, or oral mucosa transudate Patient must be at increased risk for HIV infection per USPSTF guidelines or pregnant. Tests covered annually for patients at increased risk. Pregnant patients may receive up to 3 test during pregnancy. Medical Nutrition Therapy (MNT) (for diabetes or renal disease not recommended schedule) Requires referral by treating physician for patient with diabetes or renal disease. Can be provided in same year as diabetes self-management training (DSMT), and CMS recommends medical nutrition therapy take place after DSMT. Up to 3 hours for initial year and 2 hours in subsequent years. Prostate Cancer Screening (annually up to age 76)  - Digital rectal exam (MARTÍNEZ)  - Prostate specific antigen (PSA) Annually (age 48 or over), MARTÍNEZ not paid separately when covered E/M service is provided on same date     Seasonal Influenza Vaccination (annually)      Pneumococcal Vaccination (once after 72)      Hepatitis B Vaccinations (if medium/high risk) Medium/high risk factors:  End-stage renal disease,  Hemophiliacs who received Factor VIII or IX concentrates, Clients of institutions for the mentally retarded, Persons who live in the same house as a HepB virus carrier, Homosexual men, Illicit injectable drug abusers. Screening Mammography (biennial age 54-69)? Annually (age 36 or over)     Screening Pap Tests and Pelvic Examination (up to age 79 and after 79 if unknown history or abnormal study last 10 years) Every 25 months except high risk     Ultrasound Screening for Abdominal Aortic Aneurysm (AAA) (once) Patient must be referred through IPPE and not have had a screening for abdominal aortic aneurysm before under Medicare.   Limited to patients who meet one of the following criteria:  - Men who are 73-68 years old and have smoked more than 100 cigarettes in their lifetime.  -Anyone with a FH of AAA  -Anyone recommended for screening by USPSTF     Family Practice Management 2011    Health Maintenance Due   Topic Date Due    DTaP/Tdap/Td  (1 - Tdap) 09[birth year]28/1965    Annual Well Visit  01/13/2017

## 2017-03-20 NOTE — ACP (ADVANCE CARE PLANNING)
Advance Care Planning (ACP) Provider Conversation Snapshot    Date of ACP Conversation: 03/20/17   Has a living will already that he will bring in to be scanned into the chart

## 2017-03-20 NOTE — PROGRESS NOTES
.  Chief Complaint   Patient presents with   [facility]     . Gris Gunderson

## 2017-03-21 ENCOUNTER — TELEPHONE (OUTPATIENT)
Dept: FAMILY MEDICINE CLINIC | Age: 72
End: 2017-03-21

## 2017-03-21 DIAGNOSIS — I10 ESSENTIAL HYPERTENSION: ICD-10-CM

## 2017-03-21 PROBLEM — E11.9 CONTROLLED TYPE 2 DIABETES MELLITUS WITHOUT COMPLICATION, WITHOUT LONG-TERM CURRENT USE OF INSULIN (HCC): Status: ACTIVE | Noted: 2017-03-21

## 2017-03-21 LAB
ALBUMIN SERPL-MCNC: 4.4 G/DL (ref 3.5–4.8)
ALBUMIN/GLOB SERPL: 1.6 {RATIO} (ref 1.2–2.2)
ALP SERPL-CCNC: 45 IU/L (ref 39–117)
ALT SERPL-CCNC: 37 IU/L (ref 0–44)
AST SERPL-CCNC: 24 IU/L (ref 0–40)
BILIRUB SERPL-MCNC: 0.5 MG/DL (ref 0–1.2)
BUN SERPL-MCNC: 17 MG/DL (ref 8–27)
BUN/CREAT SERPL: 14 (ref 10–22)
CALCIUM SERPL-MCNC: 9.8 MG/DL (ref 8.6–10.2)
CHLORIDE SERPL-SCNC: 98 MMOL/L (ref 96–106)
CO2 SERPL-SCNC: 23 MMOL/L (ref 18–29)
CREAT SERPL-MCNC: 1.22 MG/DL (ref 0.76–1.27)
EST. AVERAGE GLUCOSE BLD GHB EST-MCNC: 180 MG/DL
GLOBULIN SER CALC-MCNC: 2.8 G/DL (ref 1.5–4.5)
GLUCOSE SERPL-MCNC: 169 MG/DL (ref 65–99)
HBA1C MFR BLD: 7.9 % (ref 4.8–5.6)
INTERPRETATION: NORMAL
POTASSIUM SERPL-SCNC: 4.6 MMOL/L (ref 3.5–5.2)
PROT SERPL-MCNC: 7.2 G/DL (ref 6–8.5)
PSA SERPL-MCNC: 1.5 NG/ML (ref 0–4)
SODIUM SERPL-SCNC: 140 MMOL/L (ref 134–144)

## 2017-03-21 RX ORDER — LISINOPRIL AND HYDROCHLOROTHIAZIDE 12.5; 2 MG/1; MG/1
1 TABLET ORAL DAILY
Qty: 90 TAB | Refills: 3 | Status: SHIPPED | OUTPATIENT
Start: 2017-03-21 | End: 2018-02-07

## 2017-03-21 RX ORDER — PRAVASTATIN SODIUM 10 MG/1
10 TABLET ORAL DAILY
Qty: 90 TAB | Refills: 3 | Status: SHIPPED | OUTPATIENT
Start: 2017-03-21 | End: 2018-02-05 | Stop reason: SDUPTHER

## 2017-03-21 RX ORDER — AMLODIPINE BESYLATE 5 MG/1
2.5 TABLET ORAL DAILY
Qty: 90 TAB | Refills: 1 | Status: SHIPPED | OUTPATIENT
Start: 2017-03-21 | End: 2018-02-07

## 2017-03-21 NOTE — TELEPHONE ENCOUNTER
His hemoglobin A1c is high. 7.9. I do not think that fixing his diet is going to be good enough. We should meet to talk about diabetes medicines.   If we start a diabetes medicine soon we will be able to see how effective it is in 3 months

## 2017-03-21 NOTE — TELEPHONE ENCOUNTER
Chief Complaint   Patient presents with    Medication Refill     Last refill:                14 months ago (1/13/2016)       pravastatin (PRAVACHOL) 10 mg tablet       Take 1 Tab by mouth daily.       Dispense: 90 Tab     Refills: 3     Start: 1/13/2016     By: Ney Palomino MD       14 months ago (1/13/2016)        lisinopril-hydrochlorothiazide (PRINZIDE, ZESTORETIC) 20-12.5 mg per tablet       Take 1 Tab by mouth daily.       Dispense: 90 Tab     Refills: 3     Start: 1/13/2016     By: Ney Palomino MD

## 2017-03-21 NOTE — TELEPHONE ENCOUNTER
Chief Complaint   Patient presents with    Medication Refill     Last refill:     14 months ago (1/13/2016)       amLODIPine (NORVASC) 5 mg tablet       Take 1 Tab by mouth daily.       Patient taking differently: Take 2.5 mg by mouth daily.       Dispense: 90 Tab     Refills: 3     Start: 1/13/2016     By: Tamia Zepeda MD       Last Ov: 3/20/17

## 2017-03-24 ENCOUNTER — OFFICE VISIT (OUTPATIENT)
Dept: FAMILY MEDICINE CLINIC | Age: 72
End: 2017-03-24

## 2017-03-24 VITALS
DIASTOLIC BLOOD PRESSURE: 78 MMHG | RESPIRATION RATE: 16 BRPM | TEMPERATURE: 98 F | HEIGHT: 73 IN | OXYGEN SATURATION: 92 % | WEIGHT: 212 LBS | HEART RATE: 95 BPM | SYSTOLIC BLOOD PRESSURE: 116 MMHG | BODY MASS INDEX: 28.1 KG/M2

## 2017-03-24 DIAGNOSIS — E11.9 CONTROLLED TYPE 2 DIABETES MELLITUS WITHOUT COMPLICATION, WITHOUT LONG-TERM CURRENT USE OF INSULIN (HCC): Primary | ICD-10-CM

## 2017-03-24 DIAGNOSIS — G47.19 EXCESSIVE DAYTIME SLEEPINESS: ICD-10-CM

## 2017-03-24 RX ORDER — METFORMIN HYDROCHLORIDE 500 MG/1
500 TABLET ORAL 2 TIMES DAILY WITH MEALS
Qty: 180 TAB | Refills: 3 | Status: SHIPPED | OUTPATIENT
Start: 2017-03-24 | End: 2018-03-12 | Stop reason: SDUPTHER

## 2017-03-24 NOTE — PROGRESS NOTES
HPI  Mitra Harris is a 66 y.o. male who presents to follow-up on blood work. A1c was elevated beyond what was expected given his reported diet. Wanted to meet with him and discussed this in detail. Consider medication. He has gained a little weight and admits that his diet has not been very good in the last few months. His wife is the primary  of their diet and they admit that they have fallen off the healthy bandwagon. He thinks that with her help they can get back on it together. He is interested in starting medication to prevent the ups and downs that his diabetes has shown in the last few years. He does not drink much water and wonders if this has bearing on his blood sugar    Also has excessive daytime sleepiness, snoring that started about the same time he gained weight again and wonders if this has bearing. PMHx:  Past Medical History:   Diagnosis Date    Hypercholesterolemia     Hypertension        Meds:   Current Outpatient Prescriptions   Medication Sig Dispense Refill    metFORMIN (GLUCOPHAGE) 500 mg tablet Take 1 Tab by mouth two (2) times daily (with meals). 180 Tab 3    amLODIPine (NORVASC) 5 mg tablet Take 0.5 Tabs by mouth daily. 90 Tab 1    pravastatin (PRAVACHOL) 10 mg tablet Take 1 Tab by mouth daily. 90 Tab 3    lisinopril-hydroCHLOROthiazide (PRINZIDE, ZESTORETIC) 20-12.5 mg per tablet Take 1 Tab by mouth daily. 90 Tab 3    fluticasone (FLONASE) 50 mcg/actuation nasal spray 2 Sprays by Both Nostrils route daily. 1 Bottle 2    cilostazol (PLETAL) 100 mg tablet Take 1 Tab by mouth Before breakfast and dinner. 180 Tab 3    aspirin 81 mg tablet Take 81 mg by mouth. Allergies:   No Known Allergies    Smoker:  History   Smoking Status    Former Smoker    Quit date: 3/1/2003   Smokeless Tobacco    Never Used       ETOH:   History   Alcohol use Not on file       FH: No family history on file. ROS:  As listed in HPI.  In addition:  Constitutional:   No headache, fever, fatigue, weight loss or weight gain      Eyes:   No redness, pruritis, pain, visual changes, swelling, or discharge      Ears:    No pain, loss or changes in hearing     Cardiac:    No chest pain      Resp:   No cough or shortness of breath      Neuro   No loss of consciousness, dizziness, seizures      Physical Exam:  Blood pressure 116/78, pulse 95, temperature 98 °F (36.7 °C), resp. rate 16, height 6' 1\" (1.854 m), weight 212 lb (96.2 kg), SpO2 92 %. GEN: No apparent distress. Alert and oriented and responds to all questions appropriately. NEUROLOGIC:  No focal neurologic deficits. Strength and sensation grossly intact. Coordination and gait grossly intact. EXT: Well perfused. No edema. SKIN: No obvious rashes. Assessment and Plan     Diabetes, technically controlled but borderline. Worsening  Start metformin 500 mg twice daily  Diet modification  Advised that common side effect is loose stool. He enjoys a vodka cocktail every evening and does not appear to be willing to give this up. If the interaction is causing stomach upset avoid the evening dose of metformin    Of the many things he brought up today the only one with significant bearing on chronic medical issues is his excessive daytime sleepiness. He is a chronic snorer but notices that the daytime sleepiness has gotten worse since he gained a little weight back. Has never been evaluated for DEON. Not willing to be evaluated at the moment but agreed to revisit the issue at the next visit    Is he taking Pletal?  Did not mention it when he was told me the medications he takes. Has known PAD and is followed by vascular surgeon    Follow-up 3 months for A1c check. Foot exam and microalbumin at that time      ICD-10-CM ICD-9-CM    1. Controlled type 2 diabetes mellitus without complication, without long-term current use of insulin ([facility]) E11.9 250.00 metFORMIN (GLUCOPHAGE) 500 mg tablet   2.  Excessive daytime sleepiness G47.19 780.54        AVS given. Pt expressed understanding of instructions  This was a 30-minute visit. Greater than 50% of the time was spent counseling the patient on diabetes management and other numerous questions.

## 2017-05-10 ENCOUNTER — TELEPHONE (OUTPATIENT)
Dept: FAMILY MEDICINE CLINIC | Age: 72
End: 2017-05-10

## 2017-05-10 ENCOUNTER — HOSPITAL ENCOUNTER (OUTPATIENT)
Dept: CT IMAGING | Age: 72
Discharge: HOME | End: 2017-05-10
Attending: FAMILY MEDICINE
Payer: SELF-PAY

## 2017-05-10 DIAGNOSIS — F17.210 SMOKING GREATER THAN 30 PACK YEARS: ICD-10-CM

## 2017-05-10 PROCEDURE — G0297 LDCT FOR LUNG CA SCREEN: HCPCS

## 2017-05-10 NOTE — TELEPHONE ENCOUNTER
Reviewed CT lung cancer screening. No sign of lung cancer, need to repeat this test every year. There was significant coronary artery calcification. He already takes an aspirin a day and cholesterol medicine for this.   Need to make sure his blood pressure and blood sugar are under excellent control

## 2017-06-26 ENCOUNTER — OFFICE VISIT (OUTPATIENT)
Dept: FAMILY MEDICINE CLINIC | Age: 72
End: 2017-06-26

## 2017-06-26 VITALS
OXYGEN SATURATION: 94 % | HEIGHT: 73 IN | RESPIRATION RATE: 12 BRPM | SYSTOLIC BLOOD PRESSURE: 147 MMHG | WEIGHT: 199 LBS | DIASTOLIC BLOOD PRESSURE: 88 MMHG | BODY MASS INDEX: 26.37 KG/M2 | HEART RATE: 72 BPM | TEMPERATURE: 98 F

## 2017-06-26 DIAGNOSIS — E11.9 CONTROLLED TYPE 2 DIABETES MELLITUS WITHOUT COMPLICATION, WITHOUT LONG-TERM CURRENT USE OF INSULIN (HCC): Primary | ICD-10-CM

## 2017-06-26 DIAGNOSIS — I10 ESSENTIAL HYPERTENSION: ICD-10-CM

## 2017-06-26 DIAGNOSIS — I73.9 PAD (PERIPHERAL ARTERY DISEASE) (HCC): ICD-10-CM

## 2017-06-26 LAB
ALBUMIN UR QL STRIP: 10 MG/L
CREATININE, URINE POC: 100 MG/DL
HBA1C MFR BLD HPLC: 5.6 %
MICROALBUMIN/CREAT RATIO POC: <30 MG/G

## 2017-06-26 NOTE — PROGRESS NOTES
HPI  Ivette Mcghee is a 66 y.o. male who presents to follow-up on diabetes. I saw him 3 months ago when his A1c was elevated. It has been going up over the last year because he has fallen off of his healthy diet. Wife was a major  in this. After we had a long talk about his diet he feels like he has been doing great for the last 3 months. Has cut potatoes out entirely, decrease the amount of sugar. He is paying close attention to nutrition facts. His lost 13 pounds. In addition to that he started taking metformin 500 mg twice daily. Takes this with food and has a little constipation but no other GI side effects    PMHx:  Past Medical History:   Diagnosis Date    Hypercholesterolemia     Hypertension        Meds:   Current Outpatient Prescriptions   Medication Sig Dispense Refill    metFORMIN (GLUCOPHAGE) 500 mg tablet Take 1 Tab by mouth two (2) times daily (with meals). 180 Tab 3    amLODIPine (NORVASC) 5 mg tablet Take 0.5 Tabs by mouth daily. 90 Tab 1    pravastatin (PRAVACHOL) 10 mg tablet Take 1 Tab by mouth daily. 90 Tab 3    lisinopril-hydroCHLOROthiazide (PRINZIDE, ZESTORETIC) 20-12.5 mg per tablet Take 1 Tab by mouth daily. 90 Tab 3    fluticasone (FLONASE) 50 mcg/actuation nasal spray 2 Sprays by Both Nostrils route daily. 1 Bottle 2    cilostazol (PLETAL) 100 mg tablet Take 1 Tab by mouth Before breakfast and dinner. 180 Tab 3    aspirin 81 mg tablet Take 81 mg by mouth. Allergies:   No Known Allergies    Smoker:  History   Smoking Status    Former Smoker    Quit date: 3/1/2003   Smokeless Tobacco    Never Used       ETOH:   History   Alcohol use Not on file       FH: No family history on file. ROS:   As listed in HPI.  In addition:  Constitutional:   No headache, fever, fatigue, weight loss or weight gain      Cardiac:    No chest pain      Resp:   No cough or shortness of breath      Neuro   No loss of consciousness, dizziness, seizures      Physical Exam:  Blood pressure 147/88, pulse 72, temperature 98 °F (36.7 °C), resp. rate 12, height 6' 1\" (1.854 m), weight 199 lb (90.3 kg), SpO2 94 %. GEN: No apparent distress. Alert and oriented and responds to all questions appropriately. NEUROLOGIC:  No focal neurologic deficits. Strength and sensation grossly intact. Coordination and gait grossly intact. EXT: Well perfused. No edema. SKIN: No obvious rashes. Diabetic foot exam, intact microfiber, no lesions       Assessment and Plan     Diabetes  A1c 5.6 today, controlled. I recommended continue metformin 500 mg twice daily for 1 year and then consider stopping if he is able to maintain this diet. Would not be unreasonable to continue this beyond 1 year even with diet improvement. 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 This will probably be okay to consume  Has 10 crab pots on the [employer] and enjoys eating  steam crabs with his corn  Has several big vacations coming up in hopes he will be able to maintain his diet    Hypertension, little elevated today  Did not take his medicines today. Recommend he spot check his blood pressure at home    History of PAD  He is taking Pletal.  Reemphasized that we will need to keep a close eye on his blood sugar and blood pressure to protect these arteries. Feels like it is going okay right now      ICD-10-CM ICD-9-CM    1. Controlled type 2 diabetes mellitus without complication, without long-term current use of insulin ([facility]) E11.9 250.00 AMB POC HEMOGLOBIN A1C      AMB POC URINE, MICROALBUMIN, SEMIQUANT (3 RESULTS)       DIABETES FOOT EXAM   2. Essential hypertension I10 401.9    3. PAD (peripheral artery disease) ([facility]) I73.9 443.9        AVS given.  Pt expressed understanding of instructions

## 2017-07-03 ENCOUNTER — TELEPHONE (OUTPATIENT)
Dept: FAMILY MEDICINE CLINIC | Age: 72
End: 2017-07-03

## 2017-07-03 ENCOUNTER — OFFICE VISIT (OUTPATIENT)
Dept: FAMILY MEDICINE CLINIC | Age: 72
End: 2017-07-03

## 2017-07-03 VITALS
OXYGEN SATURATION: 96 % | HEIGHT: 73 IN | SYSTOLIC BLOOD PRESSURE: 114 MMHG | BODY MASS INDEX: 26.11 KG/M2 | HEART RATE: 84 BPM | DIASTOLIC BLOOD PRESSURE: 75 MMHG | TEMPERATURE: 98.3 F | WEIGHT: 197 LBS | RESPIRATION RATE: 17 BRPM

## 2017-07-03 DIAGNOSIS — I95.9 HYPOTENSION, UNSPECIFIED HYPOTENSION TYPE: ICD-10-CM

## 2017-07-03 DIAGNOSIS — I10 ESSENTIAL HYPERTENSION: Primary | ICD-10-CM

## 2017-07-03 DIAGNOSIS — E86.0 DEHYDRATION: ICD-10-CM

## 2017-07-03 NOTE — PROGRESS NOTES
HPI  Ashley Cervantes is a 66 y.o. male who presents for lightheaded, dizziness, hypotension. He has been taking his blood pressure several times a day for the last few days since I saw him. He first noticed that he was getting lightheaded and dizzy after being out in the sun all day and not drinking anything. He understood that this was because of dehydration. Blood pressure the time was in the 10S systolic. He rehydrated and took a half dose of his blood pressure medicine the next day. Felt okay and then took a full dose of his blood pressure medicine the day after that and got lightheaded and dizzy in [employer]. He also had some low blood pressures today even though he only took his 2.5 mg Norvasc. Orthostatic blood pressures okay today. PMHx:  Past Medical History:   Diagnosis Date    Hypercholesterolemia     Hypertension        Meds:   Current Outpatient Prescriptions   Medication Sig Dispense Refill    metFORMIN (GLUCOPHAGE) 500 mg tablet Take 1 Tab by mouth two (2) times daily (with meals). 180 Tab 3    amLODIPine (NORVASC) 5 mg tablet Take 0.5 Tabs by mouth daily. 90 Tab 1    pravastatin (PRAVACHOL) 10 mg tablet Take 1 Tab by mouth daily. 90 Tab 3    lisinopril-hydroCHLOROthiazide (PRINZIDE, ZESTORETIC) 20-12.5 mg per tablet Take 1 Tab by mouth daily. 90 Tab 3    fluticasone (FLONASE) 50 mcg/actuation nasal spray 2 Sprays by Both Nostrils route daily. 1 Bottle 2    cilostazol (PLETAL) 100 mg tablet Take 1 Tab by mouth Before breakfast and dinner. 180 Tab 3    aspirin 81 mg tablet Take 81 mg by mouth. Allergies:   No Known Allergies    Smoker:  History   Smoking Status    Former Smoker    Quit date: 3/1/2003   Smokeless Tobacco    Never Used       ETOH:   History   Alcohol use Not on file       FH: No family history on file. ROS:   As listed in HPI.  In addition:  Constitutional:   No headache, fever, fatigue, weight loss or weight gain      Cardiac:    No chest pain      Resp: No cough or shortness of breath      Neuro   No loss of consciousness,seizures      Physical Exam:  Blood pressure 114/75, pulse 84, temperature 98.3 °F (36.8 °C), temperature source Oral, resp. rate 17, height 6' 1\" (1.854 m), weight 197 lb (89.4 kg), SpO2 96 %. GEN: No apparent distress. Alert and oriented and responds to all questions appropriately. NEUROLOGIC:  No focal neurologic deficits. Strength and sensation grossly intact. Coordination and gait grossly intact. EXT: Well perfused. No edema. SKIN: No obvious rashes. Cardiovascular, regular rate and rhythm, no murmur  Dry mucous membranes       Assessment and Plan     Hypertension  Overtreated blood pressure, dehydration  Push fluids, recommend trying \"poweraid zero\" for electrolytes  Do not take your blood pressure medicine. Currently taking  Norvasc 2.5 mg  Lisinopril 20 mg  HCTZ 12.5 mg    Stop all of these medications. Rehydrate. Follow-up in about 1 week with some home blood pressure readings and may consider restarting a low-dose lisinopril. It is worth noting that last time I saw him he had had his blood pressure medicine his blood pressure is 147/88. I am not sure he needs 3 medicines in order to get this down        ICD-10-CM ICD-9-CM    1. Essential hypertension I10 401.9    2. Hypotension, unspecified hypotension type I95.9 458.9    3. Dehydration E86.0 276.51        AVS given.  Pt expressed understanding of instructions

## 2017-11-02 RX ORDER — FLUTICASONE PROPIONATE 50 MCG
2 SPRAY, SUSPENSION (ML) NASAL DAILY
Qty: 1 BOTTLE | Refills: 3 | Status: SHIPPED | OUTPATIENT
Start: 2017-11-02 | End: 2018-02-07

## 2017-11-07 ENCOUNTER — TELEPHONE (OUTPATIENT)
Dept: FAMILY MEDICINE CLINIC | Age: 72
End: 2017-11-07

## 2017-11-20 ENCOUNTER — TELEPHONE (OUTPATIENT)
Dept: FAMILY MEDICINE CLINIC | Age: 72
End: 2017-11-20

## 2017-12-14 RX ORDER — CILOSTAZOL 100 MG/1
100 TABLET ORAL
Qty: 180 TAB | Refills: 3 | Status: SHIPPED | OUTPATIENT
Start: 2017-12-14 | End: 2018-11-24 | Stop reason: SDUPTHER

## 2018-02-05 ENCOUNTER — OFFICE VISIT (OUTPATIENT)
Dept: FAMILY MEDICINE CLINIC | Age: 73
End: 2018-02-05

## 2018-02-05 ENCOUNTER — HOSPITAL ENCOUNTER (OUTPATIENT)
Dept: LAB | Age: 73
Discharge: HOME | End: 2018-02-05
Payer: MEDICARE

## 2018-02-05 VITALS
SYSTOLIC BLOOD PRESSURE: 180 MMHG | BODY MASS INDEX: 27.04 KG/M2 | WEIGHT: 204 LBS | OXYGEN SATURATION: 93 % | TEMPERATURE: 98 F | RESPIRATION RATE: 14 BRPM | HEIGHT: 73 IN | DIASTOLIC BLOOD PRESSURE: 99 MMHG | HEART RATE: 87 BPM

## 2018-02-05 DIAGNOSIS — E11.9 CONTROLLED TYPE 2 DIABETES MELLITUS WITHOUT COMPLICATION, WITHOUT LONG-TERM CURRENT USE OF INSULIN (HCC): Primary | ICD-10-CM

## 2018-02-05 DIAGNOSIS — Z23 ENCOUNTER FOR IMMUNIZATION: ICD-10-CM

## 2018-02-05 DIAGNOSIS — I73.9 PAD (PERIPHERAL ARTERY DISEASE) (HCC): ICD-10-CM

## 2018-02-05 DIAGNOSIS — N40.0 ENLARGED PROSTATE: ICD-10-CM

## 2018-02-05 DIAGNOSIS — E78.5 HYPERLIPIDEMIA, UNSPECIFIED HYPERLIPIDEMIA TYPE: ICD-10-CM

## 2018-02-05 DIAGNOSIS — I10 ESSENTIAL HYPERTENSION: ICD-10-CM

## 2018-02-05 PROCEDURE — 85025 COMPLETE CBC W/AUTO DIFF WBC: CPT

## 2018-02-05 PROCEDURE — 80053 COMPREHEN METABOLIC PANEL: CPT

## 2018-02-05 PROCEDURE — 84153 ASSAY OF PSA TOTAL: CPT

## 2018-02-05 PROCEDURE — 80061 LIPID PANEL: CPT

## 2018-02-05 PROCEDURE — 36415 COLL VENOUS BLD VENIPUNCTURE: CPT

## 2018-02-05 PROCEDURE — 84443 ASSAY THYROID STIM HORMONE: CPT

## 2018-02-05 RX ORDER — PRAVASTATIN SODIUM 10 MG/1
10 TABLET ORAL DAILY
Qty: 90 TAB | Refills: 3 | Status: SHIPPED | OUTPATIENT
Start: 2018-02-05 | End: 2019-03-10 | Stop reason: SDUPTHER

## 2018-02-05 NOTE — MR AVS SNAPSHOT
[address] [facility] [address] 
 
 
 [address] 
[phone] Patient: Gloria Rhodes MRN: [id number] Visit Information Date & Time Provider Department Dept. Phone Encounter #  
 2/5/2018  9:40 AM Abimael Pozo MD [address] [facility] [phone] [id number] Upcoming Health Maintenance Date Due DTaP/Tdap/Td series (1 - Tdap) 9/28/1966 LIPID PANEL Q1 8/31/2017 HEMOGLOBIN A1C Q6M 12/26/2017 MEDICARE YEARLY EXAM 3/21/2018 FOOT EXAM Q1 6/26/2018 MICROALBUMIN Q1 6/26/2018 EYE EXAM RETINAL OR DILATED Q1 11/29/2018 GLAUCOMA SCREENING Q2Y 11/29/2019 COLONOSCOPY 7/18/2021 Allergies as of 2/5/2018  Review Complete On: 2/5/2018 By: Anoop Moreno No Known Allergies Current Immunizations  Reviewed on 10/16/2017 Name Date Influenza High Dose Vaccine PF 10/6/2017, 10/1/2013, 1/11/2013 Influenza Vaccine Split 1/9/2012 Pneumococcal Conjugate (PCV-13) 1/13/2016 ZZZ-RETIRED (DO NOT USE) Pneumococcal Vaccine (Unspecified Type) 1/9/2012 Zoster Vaccine, Live 1/1/2014 Not reviewed this visit You Were Diagnosed With   
  
 Codes Comments Controlled type 2 diabetes mellitus without complication, without long-term current use of insulin ([facility]    -  Primary ICD-10-CM: E11.9 ICD-9-CM: 250.00 Hyperlipidemia, unspecified hyperlipidemia type     ICD-10-CM: E78.5 ICD-9-CM: 272.4 Essential hypertension     ICD-10-CM: I10 
ICD-9-CM: 401.9 Enlarged prostate     ICD-10-CM: N40.0 ICD-9-CM: 600.00 Encounter for immunization     ICD-10-CM: F53 ICD-9-CM: V03.89 Vitals BP Pulse Temp Resp Height(growth percentile) Weight(growth percentile) (!) 180/99 87 98 °F (36.7 °C) 14 6' 1\" (1.854 m) 204 lb (92.5 kg) SpO2 BMI Smoking Status 93% 26.91 kg/m2 Former Smoker Vitals History BMI and BSA Data Body Mass Index Body Surface Area 26.91 kg/m 2 2.18 m 2 Preferred Pharmacy Pharmacy Name Phone [facility]  [facility], [address] [facility] [phone] Your Updated Medication List  
  
   
This list is accurate as of: 18 10:57 AM.  Always use your most recent med list. amLODIPine 5 mg tablet Commonly known as:  Kavon Lords Take 0.5 Tabs by mouth daily. aspirin 81 mg tablet Take 81 mg by mouth.  
  
 cilostazol 100 mg tablet Commonly known as:  PLETAL Take 1 Tab by mouth Before breakfast and dinner. diph,pertuss(acel),tetanus vac(PF) 2 Lf-(2.5-5-3-5 mcg)-5Lf/0.5 mL Syrg vaccine Commonly known as:  ADACEL  
0.5 mL by IntraMUSCular route once for 1 dose. fluticasone 50 mcg/actuation nasal spray Commonly known as:  Memory Lust 2 Sprays by Both Nostrils route daily. lisinopril-hydroCHLOROthiazide 20-12.5 mg per tablet Commonly known as:  Talia Scrape Take 1 Tab by mouth daily. metFORMIN 500 mg tablet Commonly known as:  GLUCOPHAGE Take 1 Tab by mouth two (2) times daily (with meals). pravastatin 10 mg tablet Commonly known as:  PRAVACHOL Take 1 Tab by mouth daily. Prescriptions Printed Refills diph,pertuss,acel,,tetanus vac,PF, (ADACEL) 2 Lf-(2.5-5-3-5 mcg)-5Lf/0.5 mL syrg vaccine 0 Si.5 mL by IntraMUSCular route once for 1 dose. Class: Print Route: IntraMUSCular Prescriptions Sent to Pharmacy Refills  
 pravastatin (PRAVACHOL) 10 mg tablet 3 Sig: Take 1 Tab by mouth daily. Class: Normal  
 Pharmacy: [address]  [facility], [address] Ph #: [phone] Route: Oral  
  
We Performed the Following AMB POC HEMOGLOBIN A1C [61209 CPT(R)] CBC WITH AUTOMATED DIFF [15066 CPT(R)] LIPID PANEL [85661 CPT(R)] METABOLIC PANEL, COMPREHENSIVE [67705 CPT(R)] PSA, DIAGNOSTIC (PROSTATE SPECIFIC AG) [id number] CPT(R)] TSH 3RD GENERATION [90815 CPT(R)] Introducing [facility]! Nafisa Garza introduces BrightRoll patient portal. Now you can access parts of your medical record, email your doctor's office, and request medication refills online. 1. In your internet browser, go to [email or web address] 2. Click on the First Time User? Click Here link in the Sign In box. You will see the New Member Sign Up page. 3. Enter your BrightRoll Access Code exactly as it appears below. You will not need to use this code after youve completed the sign-up process. If you do not sign up before the expiration date, you must request a new code. · BrightRoll Access Code: [email or web address] 
Expires: 5/6/2018 10:57 AM 
 
4. Enter the last four digits of your Social Security Number (xxxx) and Date of Birth (mm/dd/yyyy) as indicated and click Submit. You will be taken to the next sign-up page. 5. Create a BrightRoll ID. This will be your BrightRoll login ID and cannot be changed, so think of one that is secure and easy to remember. 6. Create a BrightRoll password. You can change your password at any time. 7. Enter your Password Reset Question and Answer. This can be used at a later time if you forget your password. 8. Enter your e-mail address. You will receive e-mail notification when new information is available in [address] 9. Click Sign Up. You can now view and download portions of your medical record. 10. Click the Download Summary menu link to download a portable copy of your medical information. If you have questions, please visit the Frequently Asked Questions section of the BrightRoll website. Remember, BrightRoll is NOT to be used for urgent needs. For medical emergencies, dial 911. Now available from your iPhone and Android! Please provide this summary of care documentation to your next provider. Your primary care clinician is listed as Delfino Lemus.  If you have any questions after today's visit, please call [phone].

## 2018-02-06 LAB
ALBUMIN SERPL-MCNC: 4.4 G/DL (ref 3.5–4.8)
ALBUMIN/GLOB SERPL: 1.7 {RATIO} (ref 1.2–2.2)
ALP SERPL-CCNC: 40 IU/L (ref 39–117)
ALT SERPL-CCNC: 28 IU/L (ref 0–44)
AST SERPL-CCNC: 20 IU/L (ref 0–40)
BASOPHILS # BLD AUTO: 0 X10E3/UL (ref 0–0.2)
BASOPHILS NFR BLD AUTO: 0 %
BILIRUB SERPL-MCNC: 0.6 MG/DL (ref 0–1.2)
BUN SERPL-MCNC: 15 MG/DL (ref 8–27)
BUN/CREAT SERPL: 15 (ref 10–24)
CALCIUM SERPL-MCNC: 9.9 MG/DL (ref 8.6–10.2)
CHLORIDE SERPL-SCNC: 100 MMOL/L (ref 96–106)
CHOLEST SERPL-MCNC: 165 MG/DL (ref 100–199)
CO2 SERPL-SCNC: 27 MMOL/L (ref 18–29)
CREAT SERPL-MCNC: 1.01 MG/DL (ref 0.76–1.27)
EOSINOPHIL # BLD AUTO: 0.2 X10E3/UL (ref 0–0.4)
EOSINOPHIL NFR BLD AUTO: 3 %
ERYTHROCYTE [DISTWIDTH] IN BLOOD BY AUTOMATED COUNT: 13.9 % (ref 12.3–15.4)
GFR SERPLBLD CREATININE-BSD FMLA CKD-EPI: 73 ML/MIN/1.73
GFR SERPLBLD CREATININE-BSD FMLA CKD-EPI: 85 ML/MIN/1.73
GLOBULIN SER CALC-MCNC: 2.6 G/DL (ref 1.5–4.5)
GLUCOSE SERPL-MCNC: 138 MG/DL (ref 65–99)
HCT VFR BLD AUTO: 47.4 % (ref 37.5–51)
HDLC SERPL-MCNC: 71 MG/DL
HGB BLD-MCNC: 16.2 G/DL (ref 13–17.7)
IMM GRANULOCYTES # BLD: 0 X10E3/UL (ref 0–0.1)
IMM GRANULOCYTES NFR BLD: 0 %
INTERPRETATION, 910389: NORMAL
LDLC SERPL CALC-MCNC: 80 MG/DL (ref 0–99)
LYMPHOCYTES # BLD AUTO: 1 X10E3/UL (ref 0.7–3.1)
LYMPHOCYTES NFR BLD AUTO: 20 %
MCH RBC QN AUTO: 32.5 PG (ref 26.6–33)
MCHC RBC AUTO-ENTMCNC: 34.2 G/DL (ref 31.5–35.7)
MCV RBC AUTO: 95 FL (ref 79–97)
MONOCYTES # BLD AUTO: 0.6 X10E3/UL (ref 0.1–0.9)
MONOCYTES NFR BLD AUTO: 11 %
NEUTROPHILS # BLD AUTO: 3.3 X10E3/UL (ref 1.4–7)
NEUTROPHILS NFR BLD AUTO: 66 %
PLATELET # BLD AUTO: 180 X10E3/UL (ref 150–379)
POTASSIUM SERPL-SCNC: 4.7 MMOL/L (ref 3.5–5.2)
PROT SERPL-MCNC: 7 G/DL (ref 6–8.5)
PSA SERPL-MCNC: 1.6 NG/ML (ref 0–4)
RBC # BLD AUTO: 4.99 X10E6/UL (ref 4.14–5.8)
SODIUM SERPL-SCNC: 142 MMOL/L (ref 134–144)
TRIGL SERPL-MCNC: 68 MG/DL (ref 0–149)
TSH SERPL DL<=0.005 MIU/L-ACNC: 1.56 UIU/ML (ref 0.45–4.5)
VLDLC SERPL CALC-MCNC: 14 MG/DL (ref 5–40)
WBC # BLD AUTO: 5.1 X10E3/UL (ref 3.4–10.8)

## 2018-02-07 ENCOUNTER — CLINICAL SUPPORT (OUTPATIENT)
Dept: FAMILY MEDICINE CLINIC | Age: 73
End: 2018-02-07

## 2018-02-07 VITALS
SYSTOLIC BLOOD PRESSURE: 198 MMHG | HEART RATE: 94 BPM | RESPIRATION RATE: 12 BRPM | BODY MASS INDEX: 27.04 KG/M2 | OXYGEN SATURATION: 95 % | DIASTOLIC BLOOD PRESSURE: 102 MMHG | HEIGHT: 73 IN | WEIGHT: 204 LBS | TEMPERATURE: 98 F

## 2018-02-07 DIAGNOSIS — I10 ESSENTIAL HYPERTENSION: Primary | ICD-10-CM

## 2018-02-07 LAB — HBA1C MFR BLD HPLC: 6 %

## 2018-02-07 NOTE — PROGRESS NOTES
HPI  True Elise is a 67 y.o. male who presents to follow-up on his blood pressure. When I last saw him over the summer he had lightheaded, dizziness, hypotension and we took him off a lot of his blood pressure medication. Thought to be dehydrated. Previous visits he had had borderline blood pressures when he was not taking his medication. Has upper respiratory infection today and has been taking decongestants        PMHx:  Past Medical History:   Diagnosis Date    Hypercholesterolemia     Hypertension        Meds:   Current Outpatient Prescriptions   Medication Sig Dispense Refill    pravastatin (PRAVACHOL) 10 mg tablet Take 1 Tab by mouth daily. 90 Tab 3    cilostazol (PLETAL) 100 mg tablet Take 1 Tab by mouth Before breakfast and dinner. 180 Tab 3    metFORMIN (GLUCOPHAGE) 500 mg tablet Take 1 Tab by mouth two (2) times daily (with meals). 180 Tab 3    aspirin 81 mg tablet Take 81 mg by mouth.  fluticasone (FLONASE) 50 mcg/actuation nasal spray 2 Sprays by Both Nostrils route daily. 1 Bottle 3    amLODIPine (NORVASC) 5 mg tablet Take 0.5 Tabs by mouth daily. 90 Tab 1    lisinopril-hydroCHLOROthiazide (PRINZIDE, ZESTORETIC) 20-12.5 mg per tablet Take 1 Tab by mouth daily. 90 Tab 3       Allergies:   No Known Allergies    Smoker:  History   Smoking Status    Former Smoker    Quit date: 3/1/2003   Smokeless Tobacco    Never Used       ETOH:   History   Alcohol use Not on file       FH: No family history on file. ROS:   As listed in HPI. In addition:  Constitutional:   No headache, fever, fatigue, weight loss or weight gain      Cardiac:    No chest pain      Resp:   No cough or shortness of breath      Neuro   No loss of consciousness, dizziness, seizures      Physical Exam:  Blood pressure (!) 180/99, pulse 87, temperature 98 °F (36.7 °C), resp. rate 14, height 6' 1\" (1.854 m), weight 204 lb (92.5 kg), SpO2 93 %. GEN: No apparent distress.  Alert and oriented and responds to all questions appropriately. NEUROLOGIC:  No focal neurologic deficits. Strength and sensation grossly intact. Coordination and gait grossly intact. EXT: Well perfused. No edema. SKIN: No obvious rashes. Lungs clear to auscultation bilaterally  CV regular rate and rhythm no murmur       Assessment and Plan     Diabetes  A1c 6.0 (POC) good job  Continue metformin 500 mg twice daily     Hypertension  Unusually high probably because he is taking decongestants. Could get his blood pressure to come down on recheck. Has follow-up with vascular soon and they will be checking his blood pressure. Peripheral artery disease  Has his annual vascular exam coming up. Complains of mild constipation which I recommend Metamucil for    Upper respiratory infection  Try Flonase    Surveillance labs including PSA for history of enlarged prostate    Follow-up 6 months, sooner for continued elevated blood pressure    Labs reviewed, cholesterol is excellent, TSH, PSA within normal limits. Kidney function normal      ICD-10-CM ICD-9-CM    1. Controlled type 2 diabetes mellitus without complication, without long-term current use of insulin ([facility]) E11.9 250.00 AMB POC HEMOGLOBIN A1C   2. Hyperlipidemia, unspecified hyperlipidemia type E78.5 272.4 pravastatin (PRAVACHOL) 10 mg tablet      LIPID PANEL   3. Essential hypertension L13 752.7 METABOLIC PANEL, COMPREHENSIVE      TSH 3RD GENERATION      CBC WITH AUTOMATED DIFF   4. Enlarged prostate N40.0 600.00 PSA, DIAGNOSTIC (PROSTATE SPECIFIC AG)   5. Encounter for immunization Z23 V03.89 diph,pertuss,acel,,tetanus vac,PF, (ADACEL) 2 Lf-(2.5-5-3-5 mcg)-5Lf/0.5 mL syrg vaccine   6. PAD (peripheral artery disease) ([facility]) I73.9 443.9        AVS given.  Pt expressed understanding of instructions

## 2018-02-07 NOTE — PROGRESS NOTES
Chief Complaint   Patient presents with    Blood Pressure Check     Patient is here to check his home monitoring machine. BP taken by our vital machine and patients machine. Patients machine seems to be mostly accurate. Patient will continue taking his BP at home.

## 2018-03-12 DIAGNOSIS — E11.9 CONTROLLED TYPE 2 DIABETES MELLITUS WITHOUT COMPLICATION, WITHOUT LONG-TERM CURRENT USE OF INSULIN (HCC): ICD-10-CM

## 2018-03-12 RX ORDER — METFORMIN HYDROCHLORIDE 500 MG/1
TABLET ORAL
Qty: 180 TAB | Refills: 3 | Status: SHIPPED | OUTPATIENT
Start: 2018-03-12 | End: 2019-03-10 | Stop reason: SDUPTHER

## 2018-09-19 ENCOUNTER — OFFICE VISIT (OUTPATIENT)
Dept: FAMILY MEDICINE CLINIC | Age: 73
End: 2018-09-19

## 2018-09-19 VITALS
OXYGEN SATURATION: 95 % | DIASTOLIC BLOOD PRESSURE: 95 MMHG | HEIGHT: 73 IN | RESPIRATION RATE: 14 BRPM | SYSTOLIC BLOOD PRESSURE: 155 MMHG | BODY MASS INDEX: 26.9 KG/M2 | HEART RATE: 66 BPM | TEMPERATURE: 98.3 F | WEIGHT: 203 LBS

## 2018-09-19 DIAGNOSIS — Z23 ENCOUNTER FOR IMMUNIZATION: ICD-10-CM

## 2018-09-19 DIAGNOSIS — I73.9 PAD (PERIPHERAL ARTERY DISEASE) (HCC): ICD-10-CM

## 2018-09-19 DIAGNOSIS — I10 ESSENTIAL HYPERTENSION: ICD-10-CM

## 2018-09-19 DIAGNOSIS — E11.9 CONTROLLED TYPE 2 DIABETES MELLITUS WITHOUT COMPLICATION, WITHOUT LONG-TERM CURRENT USE OF INSULIN (HCC): Primary | ICD-10-CM

## 2018-09-19 LAB
ALBUMIN UR QL STRIP: 80 MG/L
CREATININE, URINE POC: 50 MG/DL
HBA1C MFR BLD HPLC: 5.8 %
MICROALBUMIN/CREAT RATIO POC: >300 MG/G

## 2018-09-19 NOTE — PROGRESS NOTES
HPI 
Steven Caban is a 67 y.o. male who presents with a concern about his blood pressure. He has been checking his blood pressure consistently after dinner. He will eat a large meal.  Moving the living room. Recliner for 30 minutes and then decided to check his blood pressure. He will then go from reclined position to a seated position and check his blood pressure. Doing this is consistently 95/65 for the last month. Yesterday it was 84/66. He is not symptomatic at these blood pressure levels  except yesterday he feels like he got up too quickly and got a little woozy but did not feel like he was going to pass out. As a separate issue he is felt that he has not had much energy this past summer. Has some nasal congestion waking up in the morning on nights where he sleeps with a fan on. Otherwise does not appear to have allergies. Merlene Po He is not too optimistic about how his blood sugars go look. He feels like he has not been trying very hard with his diet. His blood sugar looks fine today. He is tolerating his medications PMHx: 
Past Medical History:  
Diagnosis Date  Hypercholesterolemia  Hypertension Meds:  
Current Outpatient Prescriptions Medication Sig Dispense Refill  metFORMIN (GLUCOPHAGE) 500 mg tablet TAKE ONE TABLET BY MOUTH TWICE DAILY WITH MEALS 180 Tab 3  pravastatin (PRAVACHOL) 10 mg tablet Take 1 Tab by mouth daily. 90 Tab 3  
 cilostazol (PLETAL) 100 mg tablet Take 1 Tab by mouth Before breakfast and dinner. 180 Tab 3  
 aspirin 81 mg tablet Take 81 mg by mouth. Allergies:  
No Known Allergies Smoker: 
History Smoking Status  Former Smoker  Quit date: 3/1/2003 Smokeless Tobacco  
 Never Used ETOH:  
History Alcohol use Not on file FH: No family history on file. ROS:  
As listed in HPI. In addition: 
Constitutional:   No headache, fever, fatigue, weight loss or weight gain Cardiac:    No chest pain Resp:   No cough or shortness of breath Neuro   No loss of consciousness, dizziness, seizures Physical Exam: 
Blood pressure (!) 155/95, pulse 66, temperature 98.3 °F (36.8 °C), resp. rate 14, height 6' 1\" (1.854 m), weight 203 lb (92.1 kg), SpO2 95 %. GEN: No apparent distress. Alert and oriented and responds to all questions appropriately. NEUROLOGIC:  No focal neurologic deficits. Strength and sensation grossly intact. Coordination and gait grossly intact. EXT: Well perfused. No edema. SKIN: No obvious rashes. Lungs clear to auscultation bilaterally CV regular rate and rhythm no murmur HEENT, clear tympanic membrane, clear nasal mucosa, clear oropharynx. Shotty lymphadenopathy Assessment and Plan Hypertension with peripheral artery disease. He was having hypotension in the recent past so we stopped his blood pressure medication and his blood pressure did fine. Comes in today complaining of asymptomatic low blood pressure when he is seated after a big meal.  His wife's blood pressure measured with same cuff is very consistent. As a matter fact he checked his blood pressure right before coming here and his blood pressure is consistent with our initial values (180/90) Orthostatics were consistently high but did not fluctuate Give him the benefit of the doubt for white coat, check BP at home more consistently and at other times of the day. Write numbers down and return for BP consistently greater that 150/90 PAD, Aortofemoral bypass 
pletal and asa DM Well controlled. Continue metformin 500mg bid Microalbumin >300 Low threshold for restarting ACEi ICD-10-CM ICD-9-CM 1. Controlled type 2 diabetes mellitus without complication, without long-term current use of insulin ([facility]) E11.9 250.00 AMB POC HEMOGLOBIN A1C AMB POC URINE, MICROALBUMIN, SEMIQUANT (3 RESULTS) 2. PAD (peripheral artery disease) ([facility]) I73.9 443.9 3. Essential hypertension I10 401.9 AVS given. Pt expressed understanding of instructions

## 2018-09-19 NOTE — PROGRESS NOTES
Prieto Bishop is a 67 y.o. male who presents for routine immunizations. He denies any symptoms , reactions or allergies that would exclude them from being immunized today. Risks and adverse reactions were discussed and the VIS was given to them. All questions were addressed. He was observed for 10 min post injection. There were no reactions observed. Flu vaccine given IM per VORB from Dr. Obdulio Ruffin LPN

## 2018-09-19 NOTE — MR AVS SNAPSHOT
[facility] 
 
 
 [address], [address] 
[phone] Patient: Gerber Vincent MRN: [id number] Visit Information Date & Time Provider Department Dept. Phone Encounter #  
 9/19/2018  1:20 PM Cristina Lee MD [address] [facility] [phone] [id number] Upcoming Health Maintenance Date Due DTaP/Tdap/Td series (1 - Tdap) 9/28/1966 MEDICARE YEARLY EXAM 3/21/2018 FOOT EXAM Q1 6/26/2018 MICROALBUMIN Q1 6/26/2018 Influenza Age 5 to Adult 8/1/2018 HEMOGLOBIN A1C Q6M 8/5/2018 EYE EXAM RETINAL OR DILATED Q1 11/29/2018 LIPID PANEL Q1 2/5/2019 GLAUCOMA SCREENING Q2Y 11/29/2019 COLONOSCOPY 7/18/2021 Allergies as of 9/19/2018  Review Complete On: 9/19/2018 By: Angelica Adrian No Known Allergies Current Immunizations  Reviewed on 10/16/2017 Name Date Influenza High Dose Vaccine PF 10/6/2017, 10/1/2013, 1/11/2013 Influenza Vaccine (Tri) Adjuvanted  Incomplete Influenza Vaccine Split 1/9/2012 Pneumococcal Conjugate (PCV-13) 1/13/2016 ZZZ-RETIRED (DO NOT USE) Pneumococcal Vaccine (Unspecified Type) 1/9/2012 Zoster Vaccine, Live 1/1/2014 Not reviewed this visit You Were Diagnosed With   
  
 Codes Comments Controlled type 2 diabetes mellitus without complication, without long-term current use of insulin ([facility]    -  Primary ICD-10-CM: E11.9 ICD-9-CM: 250.00 PAD (peripheral artery disease) ([facility])     ICD-10-CM: I73.9 ICD-9-CM: 443.9 Essential hypertension     ICD-10-CM: I10 
ICD-9-CM: 401.9 Encounter for immunization     ICD-10-CM: K89 ICD-9-CM: V03.89 Vitals BP Pulse Temp Resp Height(growth percentile) Weight(growth percentile) (!) 155/95 66 98.3 °F (36.8 °C) 14 6' 1\" (1.854 m) 203 lb (92.1 kg) SpO2 BMI Smoking Status 95% 26.78 kg/m2 Former Smoker Vitals History BMI and BSA Data Body Mass Index Body Surface Area  
 26.78 kg/m 2 2.18 m 2 Preferred Pharmacy Pharmacy Name Phone [facility] [address] [facility] [phone] Your Updated Medication List  
  
   
This list is accurate as of 9/19/18  2:06 PM.  Always use your most recent med list.  
  
  
  
  
 aspirin 81 mg tablet Take 81 mg by mouth.  
  
 cilostazol 100 mg tablet Commonly known as:  PLETAL Take 1 Tab by mouth Before breakfast and dinner. metFORMIN 500 mg tablet Commonly known as:  GLUCOPHAGE  
TAKE ONE TABLET BY MOUTH TWICE DAILY WITH MEALS  
  
 pravastatin 10 mg tablet Commonly known as:  PRAVACHOL Take 1 Tab by mouth daily. We Performed the Following ADMIN INFLUENZA VIRUS VAC [ [facility]] AMB POC HEMOGLOBIN A1C [84259 CPT(R)] AMB POC URINE, MICROALBUMIN, SEMIQUANT (3 RESULTS) [75290 CPT(R)] INFLUENZA VACCINE INACTIVATED (IIV), SUBUNIT, ADJUVANTED, IM [id number] CPT(R)] Patient Instructions Vaccine Information Statement Influenza (Flu) Vaccine (Inactivated or Recombinant): What you need to know Many Vaccine Information Statements are available in [place]ian and other languages. See www.immunize.org/vis Hojas de Información Sobre Vacunas están disponibles en Español y en muchos otros idiomas. Visite www.immunize.org/vis 1. Why get vaccinated? Influenza (flu) is a contagious disease that spreads around the [place] every year, usually between October and May. Flu is caused by influenza viruses, and is spread mainly by coughing, sneezing, and close contact. Anyone can get flu. Flu strikes suddenly and can last several days. Symptoms vary by age, but can include: 
 fever/chills  sore throat  muscle aches  fatigue  cough  headache  runny or stuffy nose Flu can also lead to pneumonia and blood infections, and cause diarrhea and seizures in children. If you have a medical condition, such as heart or lung disease, flu can make it worse. Flu is more dangerous for some people. Infants and young children, people 72years of age and older, pregnant women, and people with certain health conditions or a weakened immune system are at greatest risk. Each year thousands of people in the [facility] die from flu, and many more are hospitalized. Flu vaccine can: 
 keep you from getting flu, 
 make flu less severe if you do get it, and 
 keep you from spreading flu to your family and other people. 2. Inactivated and recombinant flu vaccines A dose of flu vaccine is recommended every flu season. Children 6 months through 6years of age may need two doses during the same flu season. Everyone else needs only one dose each flu season. Some inactivated flu vaccines contain a very small amount of a mercury-based preservative called thimerosal. Studies have not shown thimerosal in vaccines to be harmful, but flu vaccines that do not contain thimerosal are available. There is no live flu virus in flu shots. They cannot cause the flu. There are many flu viruses, and they are always changing. Each year a new flu vaccine is made to protect against three or four viruses that are likely to cause disease in the upcoming flu season. But even when the vaccine doesnt exactly match these viruses, it may still provide some protection Flu vaccine cannot prevent: 
 flu that is caused by a virus not covered by the vaccine, or 
 illnesses that look like flu but are not. It takes about 2 weeks for protection to develop after vaccination, and protection lasts through the flu season. 3. Some people should not get this vaccine Tell the person who is giving you the vaccine:  If you have any severe, life-threatening allergies.    
If you ever had a life-threatening allergic reaction after a dose of flu vaccine, or have a severe allergy to any part of this vaccine, you may be advised not to get vaccinated. Most, but not all, types of flu vaccine contain a small amount of egg protein.  If you ever had Guillain-Barré Syndrome (also called GBS). Some people with a history of GBS should not get this vaccine. This should be discussed with your doctor.  If you are not feeling well. It is usually okay to get flu vaccine when you have a mild illness, but you might be asked to come back when you feel better. 4. Risks of a vaccine reaction With any medicine, including vaccines, there is a chance of reactions. These are usually mild and go away on their own, but serious reactions are also possible. Most people who get a flu shot do not have any problems with it. Minor problems following a flu shot include:  
 soreness, redness, or swelling where the shot was given  hoarseness  sore, red or itchy eyes  cough  fever  aches  headache  itching  fatigue If these problems occur, they usually begin soon after the shot and last 1 or 2 days. More serious problems following a flu shot can include the following:  There may be a small increased risk of Guillain-Barré Syndrome (GBS) after inactivated flu vaccine. This risk has been estimated at 1 or 2 additional cases per million people vaccinated. This is much lower than the risk of severe complications from flu, which can be prevented by flu vaccine.  Young children who get the flu shot along with pneumococcal vaccine (PCV13) and/or DTaP vaccine at the same time might be slightly more likely to have a seizure caused by fever. Ask your doctor for more information. Tell your doctor if a child who is getting flu vaccine has ever had a seizure. Problems that could happen after any injected vaccine:  People sometimes faint after a medical procedure, including vaccination. Sitting or lying down for about 15 minutes can help prevent fainting, and injuries caused by a fall. Tell your doctor if you feel dizzy, or have vision changes or ringing in the ears.  Some people get severe pain in the shoulder and have difficulty moving the arm where a shot was given. This happens very rarely.  Any medication can cause a severe allergic reaction. Such reactions from a vaccine are very rare, estimated at about 1 in a million doses, and would happen within a few minutes to a few hours after the vaccination. As with any medicine, there is a very remote chance of a vaccine causing a serious injury or death. The safety of vaccines is always being monitored. For more information, visit: www.cdc.gov/vaccinesafety/ 
 
 
The [facility] Vaccine Injury Compensation Program (VICP) is a federal program that was created to compensate people who may have been injured by certain vaccines.  
 
Persons who believe they may have been injured by a vaccine can learn about the program and about filing a claim by calling 6-468.224.7141 or visiting the [address] website at www.[facility].gov/vaccinecompensation. There is a time limit to file a claim for compensation. 7. How can I learn more?  Ask your healthcare provider. He or she can give you the vaccine package insert or suggest other sources of information.  Call your local or state health department.  Contact the Centers for Disease Control and Prevention (CDC): 
- Call 4-414.832.4963 (1-800-CDC-INFO) or 
- Visit CDCs website at www.cdc.gov/flu Vaccine Information Statement Inactivated Influenza Vaccine 8/7/2015 
[id number] [facility] [employer] Centers for Disease Control and Prevention Office Use Only Introducing [facility]! Darío Bauer introduces [employer] patient portal. Now you can access parts of your medical record, email your doctor's office, and request medication refills online. 1. In your internet browser, go to [email or web address] 2. Click on the First Time User? Click Here link in the Sign In box. You will see the New Member Sign Up page. 3. Enter your [employer] Access Code exactly as it appears below. You will not need to use this code after youve completed the sign-up process. If you do not sign up before the expiration date, you must request a new code. · [employer] Access Code: [email or web address] Expires: 12/18/2018  1:07 PM 
 
4. Enter the last four digits of your Social Security Number (xxxx) and Date of Birth (mm/dd/yyyy) as indicated and click Submit. You will be taken to the next sign-up page. 5. Create a [employer] ID. This will be your [employer] login ID and cannot be changed, so think of one that is secure and easy to remember. 6. Create a [employer] password. You can change your password at any time. 7. Enter your Password Reset Question and Answer. This can be used at a later time if you forget your password. 8. Enter your e-mail address. You will receive e-mail notification when new information is available in  [address] 9. Click Sign Up. You can now view and download portions of your medical record. 10. Click the Download Summary menu link to download a portable copy of your medical information. If you have questions, please visit the Frequently Asked Questions section of the [employer] website. Remember, [employer] is NOT to be used for urgent needs. For medical emergencies, dial 911. Now available from your iPhone and Android! Please provide this summary of care documentation to your next provider. Your primary care clinician is listed as Cristina Lee. If you have any questions after today's visit, please call [phone].

## 2018-09-19 NOTE — PATIENT INSTRUCTIONS
Vaccine Information Statement Influenza (Flu) Vaccine (Inactivated or Recombinant): What you need to know Many Vaccine Information Statements are available in Azeri and other languages. See www.immunize.org/vis Hojas de Información Sobre Vacunas están disponibles en Español y en muchos otros idiomas. Visite www.immunize.org/vis 1. Why get vaccinated? Influenza (flu) is a contagious disease that spreads around the [place] every year, usually between October and May. Flu is caused by influenza viruses, and is spread mainly by coughing, sneezing, and close contact. Anyone can get flu. Flu strikes suddenly and can last several days. Symptoms vary by age, but can include: 
 fever/chills  sore throat  muscle aches  fatigue  cough  headache  runny or stuffy nose Flu can also lead to pneumonia and blood infections, and cause diarrhea and seizures in children. If you have a medical condition, such as heart or lung disease, flu can make it worse. Flu is more dangerous for some people. Infants and young children, people 72years of age and older, pregnant women, and people with certain health conditions or a weakened immune system are at greatest risk. Each year thousands of people in the [facility] die from flu, and many more are hospitalized. Flu vaccine can: 
 keep you from getting flu, 
 make flu less severe if you do get it, and 
 keep you from spreading flu to your family and other people. 2. Inactivated and recombinant flu vaccines A dose of flu vaccine is recommended every flu season. Children 6 months through 6years of age may need two doses during the same flu season. Everyone else needs only one dose each flu season.   
 
 
Some inactivated flu vaccines contain a very small amount of a mercury-based preservative called thimerosal. Studies have not shown thimerosal in vaccines to be harmful, but flu vaccines that do not contain thimerosal are available. There is no live flu virus in flu shots. They cannot cause the flu. There are many flu viruses, and they are always changing. Each year a new flu vaccine is made to protect against three or four viruses that are likely to cause disease in the upcoming flu season. But even when the vaccine doesnt exactly match these viruses, it may still provide some protection Flu vaccine cannot prevent: 
 flu that is caused by a virus not covered by the vaccine, or 
 illnesses that look like flu but are not. It takes about 2 weeks for protection to develop after vaccination, and protection lasts through the flu season. 3. Some people should not get this vaccine Tell the person who is giving you the vaccine:  If you have any severe, life-threatening allergies. If you ever had a life-threatening allergic reaction after a dose of flu vaccine, or have a severe allergy to any part of this vaccine, you may be advised not to get vaccinated. Most, but not all, types of flu vaccine contain a small amount of egg protein.  If you ever had Guillain-Barré Syndrome (also called GBS). Some people with a history of GBS should not get this vaccine. This should be discussed with your doctor.  If you are not feeling well. It is usually okay to get flu vaccine when you have a mild illness, but you might be asked to come back when you feel better. 4. Risks of a vaccine reaction With any medicine, including vaccines, there is a chance of reactions. These are usually mild and go away on their own, but serious reactions are also possible. Most people who get a flu shot do not have any problems with it. Minor problems following a flu shot include:  
 soreness, redness, or swelling where the shot was given  hoarseness  sore, red or itchy eyes  cough  fever  aches  headache  itching  fatigue If these problems occur, they usually begin soon after the shot and last 1 or 2 days. More serious problems following a flu shot can include the following:  There may be a small increased risk of Guillain-Barré Syndrome (GBS) after inactivated flu vaccine. This risk has been estimated at 1 or 2 additional cases per million people vaccinated. This is much lower than the risk of severe complications from flu, which can be prevented by flu vaccine.  Young children who get the flu shot along with pneumococcal vaccine (PCV13) and/or DTaP vaccine at the same time might be slightly more likely to have a seizure caused by fever. Ask your doctor for more information. Tell your doctor if a child who is getting flu vaccine has ever had a seizure. Problems that could happen after any injected vaccine:  People sometimes faint after a medical procedure, including vaccination. Sitting or lying down for about 15 minutes can help prevent fainting, and injuries caused by a fall. Tell your doctor if you feel dizzy, or have vision changes or ringing in the ears.  Some people get severe pain in the shoulder and have difficulty moving the arm where a shot was given. This happens very rarely.  Any medication can cause a severe allergic reaction. Such reactions from a vaccine are very rare, estimated at about 1 in a million doses, and would happen within a few minutes to a few hours after the vaccination. As with any medicine, there is a very remote chance of a vaccine causing a serious injury or death. The safety of vaccines is always being monitored. For more information, visit: www.cdc.gov/vaccinesafety/ 
 
5. What if there is a serious reaction? What should I look for?  Look for anything that concerns you, such as signs of a severe allergic reaction, very high fever, or unusual behavior.  
 
Signs of a severe allergic reaction can include hives, swelling of the face and throat, difficulty breathing, a fast heartbeat, dizziness, and weakness  usually within a few minutes to a few hours after the vaccination. What should I do?  If you think it is a severe allergic reaction or other emergency that cant wait, call 9-1-1 and get the person to the nearest hospital. Otherwise, call your doctor.  Reactions should be reported to the Vaccine Adverse Event Reporting System (VAERS). Your doctor should file this report, or you can do it yourself through  the VAERS web site at www.vaers. [facility].gov, or by calling 4-658.366.2314. VAERS does not give medical advice. 6. The National Vaccine Injury Compensation Program 
 
The [facility] Vaccine Injury Compensation Program (VICP) is a federal program that was created to compensate people who may have been injured by certain vaccines. Persons who believe they may have been injured by a vaccine can learn about the program and about filing a claim by calling 3-132.922.4595 or visiting the [address] [employer] website at www.[facility].gov/vaccinecompensation. There is a time limit to file a claim for compensation. 7. How can I learn more?  Ask your healthcare provider. He or she can give you the vaccine package insert or suggest other sources of information.  Call your local or state health department.  Contact the Centers for Disease Control and Prevention (CDC): 
- Call 5-802.486.1934 (1-800-CDC-INFO) or 
- Visit CDCs website at www.cdc.gov/flu Vaccine Information Statement Inactivated Influenza Vaccine 8/7/2015 
[id number] Department of [facility] [employer] Centers for Disease Control and Prevention Office Use Only

## 2018-11-26 RX ORDER — FLUTICASONE PROPIONATE 50 MCG
SPRAY, SUSPENSION (ML) NASAL
Qty: 1 BOTTLE | Refills: 3 | Status: SHIPPED | OUTPATIENT
Start: 2018-11-26 | End: 2020-02-14

## 2018-11-26 RX ORDER — CILOSTAZOL 100 MG/1
TABLET ORAL
Qty: 180 TAB | Refills: 3 | Status: SHIPPED | OUTPATIENT
Start: 2018-11-26 | End: 2019-12-09 | Stop reason: SDUPTHER

## 2018-12-31 ENCOUNTER — OFFICE VISIT (OUTPATIENT)
Dept: FAMILY MEDICINE CLINIC | Age: 73
End: 2018-12-31

## 2018-12-31 VITALS
TEMPERATURE: 98.3 F | DIASTOLIC BLOOD PRESSURE: 113 MMHG | SYSTOLIC BLOOD PRESSURE: 164 MMHG | HEIGHT: 73 IN | BODY MASS INDEX: 27.7 KG/M2 | RESPIRATION RATE: 19 BRPM | WEIGHT: 209 LBS | HEART RATE: 98 BPM | OXYGEN SATURATION: 92 %

## 2018-12-31 DIAGNOSIS — I10 ESSENTIAL HYPERTENSION: Primary | ICD-10-CM

## 2018-12-31 DIAGNOSIS — J32.0 MAXILLARY SINUSITIS, UNSPECIFIED CHRONICITY: ICD-10-CM

## 2018-12-31 DIAGNOSIS — R05.9 COUGH: ICD-10-CM

## 2018-12-31 DIAGNOSIS — J01.20 ACUTE NON-RECURRENT ETHMOIDAL SINUSITIS: ICD-10-CM

## 2018-12-31 DIAGNOSIS — E11.9 CONTROLLED TYPE 2 DIABETES MELLITUS WITHOUT COMPLICATION, WITHOUT LONG-TERM CURRENT USE OF INSULIN (HCC): ICD-10-CM

## 2018-12-31 LAB
FLUAV+FLUBV AG NOSE QL IA.RAPID: NEGATIVE POS/NEG
FLUAV+FLUBV AG NOSE QL IA.RAPID: NEGATIVE POS/NEG
HBA1C MFR BLD HPLC: 6.6 % (ref 4.8–5.6)
VALID INTERNAL CONTROL?: YES

## 2018-12-31 RX ORDER — AMLODIPINE BESYLATE 2.5 MG/1
2.5 TABLET ORAL DAILY
Qty: 30 TAB | Refills: 0 | Status: SHIPPED | OUTPATIENT
Start: 2018-12-31 | End: 2019-01-28 | Stop reason: SDUPTHER

## 2018-12-31 RX ORDER — AMOXICILLIN AND CLAVULANATE POTASSIUM 875; 125 MG/1; MG/1
1 TABLET, FILM COATED ORAL 2 TIMES DAILY
Qty: 20 TAB | Refills: 0 | Status: SHIPPED | OUTPATIENT
Start: 2018-12-31 | End: 2019-01-10

## 2018-12-31 RX ORDER — GUAIFENESIN 600 MG/1
600 TABLET, EXTENDED RELEASE ORAL 2 TIMES DAILY
Qty: 60 TAB | Refills: 0 | Status: SHIPPED | OUTPATIENT
Start: 2018-12-31

## 2018-12-31 NOTE — PATIENT INSTRUCTIONS
Cough: Care Instructions  Your Care Instructions    A cough is your body's response to something that bothers your throat or airways. Many things can cause a cough. You might cough because of a cold or the flu, bronchitis, or asthma. Smoking, postnasal drip, allergies, and stomach acid that backs up into your throat also can cause coughs. A cough is a symptom, not a disease. Most coughs stop when the cause, such as a cold, goes away. You can take a few steps at home to cough less and feel better. Follow-up care is a key part of your treatment and safety. Be sure to make and go to all appointments, and call your doctor if you are having problems. It's also a good idea to know your test results and keep a list of the medicines you take. How can you care for yourself at home? · Drink lots of water and other fluids. This helps thin the mucus and soothes a dry or sore throat. Honey or lemon juice in hot water or tea may ease a dry cough. · Take cough medicine as directed by your doctor. · Prop up your head on pillows to help you breathe and ease a dry cough. · Try cough drops to soothe a dry or sore throat. Cough drops don't stop a cough. Medicine-flavored cough drops are no better than candy-flavored drops or hard candy. · Do not smoke. Avoid secondhand smoke. If you need help quitting, talk to your doctor about stop-smoking programs and medicines. These can increase your chances of quitting for good. When should you call for help? Call 911 anytime you think you may need emergency care.  For example, call if:    · You have severe trouble breathing.    Call your doctor now or seek immediate medical care if:    · You cough up blood.     · You have new or worse trouble breathing.     · You have a new or higher fever.     · You have a new rash.    Watch closely for changes in your health, and be sure to contact your doctor if:    · You cough more deeply or more often, especially if you notice more mucus or a change in the color of your mucus.     · You have new symptoms, such as a sore throat, an earache, or sinus pain.     · You do not get better as expected. Where can you learn more? Go to http://clayton-tracey.info/. Enter D279 in the search box to learn more about \"Cough: Care Instructions. \"  Current as of: December 6, 2017  Content Version: 11.8  © 5661-5705 [employer]. Care instructions adapted under license by [employer] (which disclaims liability or warranty for this information). If you have questions about a medical condition or this instruction, always ask your healthcare professional. [address] any warranty or liability for your use of this information.

## 2018-12-31 NOTE — TELEPHONE ENCOUNTER
Patient verified his name and . Patient notified of his lab results per Dr. Jennifer Patton. Patient verbalized understanding. Patient scheduled for 3/24/17 at 2:20. 28.3

## 2018-12-31 NOTE — PROGRESS NOTES
Subjective:   Randell Antunez is a 75 y.o. male who complains of congestion, sneezing and productive cough for 3-4 days, gradually worsening since that time. Chest feels sore from coughing so much  He denies a history of shortness of breath and wheezing. Tried OTC cold meds - took some tylenol cold relief. No evaluation to date. BP is high today -   Past Medical History:   Diagnosis Date    Hypercholesterolemia     Hypertension      Social History     Tobacco Use    Smoking status: Former Smoker     Last attempt to quit: 3/1/2003     Years since quitting: 15.8    Smokeless tobacco: Never Used   Substance Use Topics    Alcohol use: Not on file    Drug use: Not on file     Outpatient Medications Marked as Taking for the 12/31/18 encounter (Office Visit) with Marion Craig MD   Medication Sig Dispense Refill    guaiFENesin ER (MUCINEX) 600 mg ER tablet Take 1 Tab by mouth two (2) times a day. 60 Tab 0    amLODIPine (NORVASC) 2.5 mg tablet Take 1 Tab by mouth daily. 30 Tab 0    amoxicillin-clavulanate (AUGMENTIN) 875-125 mg per tablet Take 1 Tab by mouth two (2) times a day for 10 days. 20 Tab 0    cilostazol (PLETAL) 100 mg tablet TAKE 1 TABLET BY MOUTH TWICE A DAY BEFORE BREAKFAST AND DINNER 180 Tab 3    fluticasone (FLONASE) 50 mcg/actuation nasal spray USE TWO SPRAY(S) IN EACH NOSTRIL ONCE DAILY 1 Bottle 3    metFORMIN (GLUCOPHAGE) 500 mg tablet TAKE ONE TABLET BY MOUTH TWICE DAILY WITH MEALS 180 Tab 3    pravastatin (PRAVACHOL) 10 mg tablet Take 1 Tab by mouth daily. 90 Tab 3    aspirin 81 mg tablet Take 81 mg by mouth. No Known Allergies     Review of Systems  A comprehensive review of systems was negative except for that written in the HPI.     Objective:     Visit Vitals  BP (!) 164/113 (BP 1 Location: Left arm, BP Patient Position: Sitting)   Pulse 98   Temp 98.3 °F (36.8 °C) (Oral)   Resp 19   Ht 6' 1\" (1.854 m)   Wt 209 lb (94.8 kg)   SpO2 92%   BMI 27.57 kg/m²     General: alert, cooperative   Eyes: conjunctivae/scleras clear. PERRL, EOM's intact   Ears: External auditory canals clear, serous otitis   Sinuses/Nose: Bilateral maxillary or frontal tenderness. clear rhinorrhea present. Mouth:  No oral lesions, mild pharyngeal erythema, no exudates       Heart: S1 and S2 normal,no murmurs noted    Lungs: Coarse to auscultation bilaterally, no increased work of breathing   Neuro Cranial nerves intact. DTRs and strength intact and symmetric. Sensation intact. Extremities: No edema or cyanosis          Results for orders placed or performed in visit on 12/31/18   AMB POC BOBBY INFLUENZA A/B TEST   Result Value Ref Range    VALID INTERNAL CONTROL POC Yes     Influenza A Ag POC Negative Negative Pos/Neg    Influenza B Ag POC Negative Negative Pos/Neg       Assessment/Plan:     1. Essential hypertension - elevated today, restart amlodipine, follow up with Dr Salma Parikh    2. Cough    3. Maxillary sinusitis, unspecified chronicity - start augmentin       5. Controlled type 2 diabetes mellitus without complication, without long-term current use of insulin ([facility])  - recheck A1C today         Orders Placed This Encounter    AMB POC BOBBY INFLUENZA A/B TEST    AMB POC HEMOGLOBIN A1C    guaiFENesin ER (MUCINEX) 600 mg ER tablet     Sig: Take 1 Tab by mouth two (2) times a day. Dispense:  60 Tab     Refill:  0    amLODIPine (NORVASC) 2.5 mg tablet     Sig: Take 1 Tab by mouth daily. Dispense:  30 Tab     Refill:  0    amoxicillin-clavulanate (AUGMENTIN) 875-125 mg per tablet     Sig: Take 1 Tab by mouth two (2) times a day for 10 days. Dispense:  20 Tab     Refill:  0         Verbal and written instructions (see AVS) provided. Patient expresses understanding of diagnosis and treatment plan.

## 2018-12-31 NOTE — PROGRESS NOTES
Chief Complaint   Patient presents with    Sore Throat    Nasal Congestion     chest congestion    Cough     Health Maintenance reviewed     1. Have you been to the ER, urgent care clinic since your last visit? Hospitalized since your last visit? No    2. Have you seen or consulted any other health care providers outside of the [address] [place] [address] since your last visit? Include any pap smears or colon screening.  No

## 2019-01-28 DIAGNOSIS — I10 ESSENTIAL HYPERTENSION: ICD-10-CM

## 2019-01-28 RX ORDER — AMLODIPINE BESYLATE 2.5 MG/1
2.5 TABLET ORAL DAILY
Qty: 90 TAB | Refills: 3 | Status: SHIPPED | OUTPATIENT
Start: 2019-01-28 | End: 2019-04-02 | Stop reason: SDUPTHER

## 2019-01-28 NOTE — TELEPHONE ENCOUNTER
Patient verified his name and . Patient notified refill sent to [facility]. Patient states he will make annual wellness visit soon.

## 2019-03-10 DIAGNOSIS — E11.9 CONTROLLED TYPE 2 DIABETES MELLITUS WITHOUT COMPLICATION, WITHOUT LONG-TERM CURRENT USE OF INSULIN (HCC): ICD-10-CM

## 2019-03-10 DIAGNOSIS — E78.5 HYPERLIPIDEMIA, UNSPECIFIED HYPERLIPIDEMIA TYPE: ICD-10-CM

## 2019-03-11 RX ORDER — METFORMIN HYDROCHLORIDE 500 MG/1
TABLET ORAL
Qty: 180 TAB | Refills: 3 | Status: SHIPPED | OUTPATIENT
Start: 2019-03-11 | End: 2020-03-31 | Stop reason: SINTOL

## 2019-03-11 RX ORDER — PRAVASTATIN SODIUM 10 MG/1
TABLET ORAL
Qty: 90 TAB | Refills: 3 | Status: SHIPPED | OUTPATIENT
Start: 2019-03-11 | End: 2020-03-10

## 2019-03-26 ENCOUNTER — TELEPHONE (OUTPATIENT)
Dept: FAMILY MEDICINE CLINIC | Age: 74
End: 2019-03-26

## 2019-03-26 DIAGNOSIS — N40.0 ENLARGED PROSTATE: ICD-10-CM

## 2019-03-26 DIAGNOSIS — E78.5 HYPERLIPIDEMIA, UNSPECIFIED HYPERLIPIDEMIA TYPE: ICD-10-CM

## 2019-03-26 DIAGNOSIS — I10 ESSENTIAL HYPERTENSION: Primary | ICD-10-CM

## 2019-03-26 DIAGNOSIS — E11.9 CONTROLLED TYPE 2 DIABETES MELLITUS WITHOUT COMPLICATION, WITHOUT LONG-TERM CURRENT USE OF INSULIN (HCC): ICD-10-CM

## 2019-03-26 NOTE — TELEPHONE ENCOUNTER
Patient scheduled to see Dr. Cecile England 4/2/19. Patient scheduled for fasting labs tomorrow. Please order labs.

## 2019-03-27 ENCOUNTER — HOSPITAL ENCOUNTER (OUTPATIENT)
Dept: LAB | Age: 74
Discharge: HOME | End: 2019-03-27
Payer: MEDICARE

## 2019-03-27 PROCEDURE — 36415 COLL VENOUS BLD VENIPUNCTURE: CPT

## 2019-03-27 PROCEDURE — 80061 LIPID PANEL: CPT

## 2019-03-27 PROCEDURE — 85025 COMPLETE CBC W/AUTO DIFF WBC: CPT

## 2019-03-27 PROCEDURE — 83036 HEMOGLOBIN GLYCOSYLATED A1C: CPT

## 2019-03-27 PROCEDURE — 80053 COMPREHEN METABOLIC PANEL: CPT

## 2019-03-27 PROCEDURE — 84153 ASSAY OF PSA TOTAL: CPT

## 2019-03-27 PROCEDURE — 84443 ASSAY THYROID STIM HORMONE: CPT

## 2019-03-28 LAB
ALBUMIN SERPL-MCNC: 4.4 G/DL (ref 3.5–4.8)
ALBUMIN/GLOB SERPL: 1.7 {RATIO} (ref 1.2–2.2)
ALP SERPL-CCNC: 41 IU/L (ref 39–117)
ALT SERPL-CCNC: 33 IU/L (ref 0–44)
AST SERPL-CCNC: 21 IU/L (ref 0–40)
BASOPHILS # BLD AUTO: 0 X10E3/UL (ref 0–0.2)
BASOPHILS NFR BLD AUTO: 0 %
BILIRUB SERPL-MCNC: 0.4 MG/DL (ref 0–1.2)
BUN SERPL-MCNC: 19 MG/DL (ref 8–27)
BUN/CREAT SERPL: 19 (ref 10–24)
CALCIUM SERPL-MCNC: 9.9 MG/DL (ref 8.6–10.2)
CHLORIDE SERPL-SCNC: 103 MMOL/L (ref 96–106)
CHOLEST SERPL-MCNC: 153 MG/DL (ref 100–199)
CO2 SERPL-SCNC: 26 MMOL/L (ref 20–29)
CREAT SERPL-MCNC: 1.02 MG/DL (ref 0.76–1.27)
EOSINOPHIL # BLD AUTO: 0.1 X10E3/UL (ref 0–0.4)
EOSINOPHIL NFR BLD AUTO: 2 %
ERYTHROCYTE [DISTWIDTH] IN BLOOD BY AUTOMATED COUNT: 14.4 % (ref 12.3–15.4)
EST. AVERAGE GLUCOSE BLD GHB EST-MCNC: 148 MG/DL
GLOBULIN SER CALC-MCNC: 2.6 G/DL (ref 1.5–4.5)
GLUCOSE SERPL-MCNC: 141 MG/DL (ref 65–99)
HBA1C MFR BLD: 6.8 % (ref 4.8–5.6)
HCT VFR BLD AUTO: 46.3 % (ref 37.5–51)
HDLC SERPL-MCNC: 65 MG/DL
HGB BLD-MCNC: 16.3 G/DL (ref 13–17.7)
IMM GRANULOCYTES # BLD AUTO: 0 X10E3/UL (ref 0–0.1)
IMM GRANULOCYTES NFR BLD AUTO: 0 %
INTERPRETATION, 910389: NORMAL
LDLC SERPL CALC-MCNC: 78 MG/DL (ref 0–99)
LYMPHOCYTES # BLD AUTO: 1.3 X10E3/UL (ref 0.7–3.1)
LYMPHOCYTES NFR BLD AUTO: 28 %
Lab: NORMAL
MCH RBC QN AUTO: 33.1 PG (ref 26.6–33)
MCHC RBC AUTO-ENTMCNC: 35.2 G/DL (ref 31.5–35.7)
MCV RBC AUTO: 94 FL (ref 79–97)
MONOCYTES # BLD AUTO: 0.3 X10E3/UL (ref 0.1–0.9)
MONOCYTES NFR BLD AUTO: 7 %
NEUTROPHILS # BLD AUTO: 3 X10E3/UL (ref 1.4–7)
NEUTROPHILS NFR BLD AUTO: 63 %
PLATELET # BLD AUTO: 173 X10E3/UL (ref 150–379)
POTASSIUM SERPL-SCNC: 4.4 MMOL/L (ref 3.5–5.2)
PROT SERPL-MCNC: 7 G/DL (ref 6–8.5)
PSA SERPL-MCNC: 1.6 NG/ML (ref 0–4)
RBC # BLD AUTO: 4.92 X10E6/UL (ref 4.14–5.8)
REFLEX CRITERIA: NORMAL
SODIUM SERPL-SCNC: 144 MMOL/L (ref 134–144)
TRIGL SERPL-MCNC: 52 MG/DL (ref 0–149)
TSH SERPL DL<=0.005 MIU/L-ACNC: 1.65 UIU/ML (ref 0.45–4.5)
VLDLC SERPL CALC-MCNC: 10 MG/DL (ref 5–40)
WBC # BLD AUTO: 4.8 X10E3/UL (ref 3.4–10.8)

## 2019-04-02 ENCOUNTER — OFFICE VISIT (OUTPATIENT)
Dept: FAMILY MEDICINE CLINIC | Age: 74
End: 2019-04-02

## 2019-04-02 VITALS
HEIGHT: 73 IN | OXYGEN SATURATION: 94 % | HEART RATE: 91 BPM | TEMPERATURE: 98.2 F | SYSTOLIC BLOOD PRESSURE: 168 MMHG | WEIGHT: 209 LBS | BODY MASS INDEX: 27.7 KG/M2 | RESPIRATION RATE: 16 BRPM | DIASTOLIC BLOOD PRESSURE: 90 MMHG

## 2019-04-02 DIAGNOSIS — Z00.00 ROUTINE GENERAL MEDICAL EXAMINATION AT A HEALTH CARE FACILITY: Primary | ICD-10-CM

## 2019-04-02 DIAGNOSIS — I73.9 PAD (PERIPHERAL ARTERY DISEASE) (HCC): ICD-10-CM

## 2019-04-02 DIAGNOSIS — I10 ESSENTIAL HYPERTENSION: ICD-10-CM

## 2019-04-02 DIAGNOSIS — E11.9 CONTROLLED TYPE 2 DIABETES MELLITUS WITHOUT COMPLICATION, WITHOUT LONG-TERM CURRENT USE OF INSULIN (HCC): ICD-10-CM

## 2019-04-02 DIAGNOSIS — E78.5 HYPERLIPIDEMIA, UNSPECIFIED HYPERLIPIDEMIA TYPE: ICD-10-CM

## 2019-04-02 RX ORDER — AMLODIPINE BESYLATE 2.5 MG/1
2.5 TABLET ORAL DAILY
Qty: 90 TAB | Refills: 3 | Status: SHIPPED | OUTPATIENT
Start: 2019-04-02 | End: 2020-04-03 | Stop reason: SDUPTHER

## 2019-04-02 RX ORDER — HYDROCHLOROTHIAZIDE 12.5 MG/1
12.5 TABLET ORAL DAILY
Qty: 90 TAB | Refills: 3 | Status: SHIPPED | OUTPATIENT
Start: 2019-04-02

## 2019-04-02 NOTE — LETTER
4/2/2019 4:49 PM 
 
Mr. Franchesca Gregg [address] [address] Dear Franchesca Gregg: 
 
Please find your most recent results below. Resulted Orders TSH 3RD GENERATION (Collected: 3/27/2019 10:05 AM) Result Value Ref Range TSH 1.650 0.450 - 4.500 uIU/mL Narrative Performed at:  [address]  [id number] : Linn Aceves MD, Phone:  [phone] METABOLIC PANEL, COMPREHENSIVE (Collected: 3/27/2019 10:05 AM) Result Value Ref Range Glucose 141 (H) 65 - 99 mg/dL BUN 19 8 - 27 mg/dL Creatinine 1.02 0.76 - 1.27 mg/dL GFR est non-AA 72 >59 mL/min/1.73 GFR est AA 83 >59 mL/min/1.73  
 BUN/Creatinine ratio 19 10 - 24 Sodium 144 134 - 144 mmol/L Potassium 4.4 3.5 - 5.2 mmol/L Chloride 103 96 - 106 mmol/L  
 CO2 26 20 - 29 mmol/L Calcium 9.9 8.6 - 10.2 mg/dL Protein, total 7.0 6.0 - 8.5 g/dL Albumin 4.4 3.5 - 4.8 g/dL GLOBULIN, TOTAL 2.6 1.5 - 4.5 g/dL A-G Ratio 1.7 1.2 - 2.2 Bilirubin, total 0.4 0.0 - 1.2 mg/dL Alk. phosphatase 41 39 - 117 IU/L  
 AST (SGOT) 21 0 - 40 IU/L  
 ALT (SGPT) 33 0 - 44 IU/L Narrative Performed at:  [address]  [id number] : Linn Aceves MD, Phone:  [phone] CBC WITH AUTOMATED DIFF (Collected: 3/27/2019 10:05 AM) Result Value Ref Range WBC 4.8 3.4 - 10.8 x10E3/uL  
 RBC 4.92 4.14 - 5.80 x10E6/uL HGB 16.3 13.0 - 17.7 g/dL HCT 46.3 37.5 - 51.0 % MCV 94 79 - 97 fL  
 MCH 33.1 (H) 26.6 - 33.0 pg  
 MCHC 35.2 31.5 - 35.7 g/dL  
 RDW 14.4 12.3 - 15.4 % PLATELET 106 600 - 922 x10E3/uL NEUTROPHILS 63 Not Estab. % Lymphocytes 28 Not Estab. % MONOCYTES 7 Not Estab. % EOSINOPHILS 2 Not Estab. % BASOPHILS 0 Not Estab. %  
 ABS. NEUTROPHILS 3.0 1.4 - 7.0 x10E3/uL Abs Lymphocytes 1.3 0.7 - 3.1 x10E3/uL  
 ABS. MONOCYTES 0.3 0.1 - 0.9 x10E3/uL ABS. EOSINOPHILS 0.1 0.0 - 0.4 x10E3/uL  
 ABS. BASOPHILS 0.0 0.0 - 0.2 x10E3/uL IMMATURE GRANULOCYTES 0 Not Estab. %  
 ABS. IMM. GRANS. 0.0 0.0 - 0.1 x10E3/uL Narrative Performed at:  [address]  [id number] : Elba Fothergill MD, Phone:  [phone] LIPID PANEL (Collected: 3/27/2019 10:05 AM) Result Value Ref Range Cholesterol, total 153 100 - 199 mg/dL Triglyceride 52 0 - 149 mg/dL HDL Cholesterol 65 >39 mg/dL VLDL, calculated 10 5 - 40 mg/dL LDL, calculated 78 0 - 99 mg/dL Narrative Performed at:  [address]  [id number] : Elba Fothergill MD, Phone:  [phone] HEMOGLOBIN A1C WITH EAG (Collected: 3/27/2019 10:05 AM) Result Value Ref Range Hemoglobin A1c 6.8 (H) 4.8 - 5.6 % Comment:  
            Prediabetes: 5.7 - 6.4 Diabetes: >6.4 Glycemic control for adults with diabetes: <7.0 Estimated average glucose 148 mg/dL Narrative Performed at:  [address]  [id number] : Elba Fothergill MD, Phone:  [phone] PSA W/ REFLX FREE PSA (Collected: 3/27/2019 10:05 AM) Result Value Ref Range Prostate Specific Ag 1.6 0.0 - 4.0 ng/mL Comment:  
   Roche ECLIA methodology. According to the American Urological Association, Serum PSA should 
decrease and remain at undetectable levels after radical 
prostatectomy. The AUA defines biochemical recurrence as an initial 
PSA value 0.2 ng/mL or greater followed by a subsequent confirmatory PSA value 0.2 ng/mL or greater. Values obtained with different assay methods or kits cannot be used 
interchangeably. Results cannot be interpreted as absolute evidence 
of the presence or absence of malignant disease. Reflex Criteria Comment    Comment:  
   The percent free PSA is performed on a reflex basis only when the 
 total PSA is between 4.0 and 10.0 ng/mL. Narrative Performed at:  [id number] [address]  [id number] : Miranda Arcos MD, Phone:  [phone] CVD REPORT (Collected: 3/27/2019 10:05 AM) Result Value Ref Range INTERPRETATION Note Comment:  
   Supplemental report is available. Narrative Performed at:  [address] [place] [address]  [id number] : Joyce Gaspar MD, Phone:  [phone] DIABETES PATIENT EDUCATION (Collected: 3/27/2019 10:05 AM) Result Value Ref Range PDF Image Not applicable Narrative Performed at:  [address] [place] [address]  [id number] : Joyce Gaspar MD, Phone:  [phone] Please call me if you have any questions: [phone] Sincerely, Salty Oconnell MD

## 2019-04-02 NOTE — PROGRESS NOTES
. 
Chief Complaint Patient presents with  Physical  
 
.1. Have you been to the ER, urgent care clinic since your last visit? Hospitalized since your last visit? no 
 
2. Have you seen or consulted any other health care providers outside of the [address] [place] [address] since your last visit? Include any pap smears or colon screening. no 
 
Pt received 1 out of 2 vaccine of shigles shingrix, 3/18 at [facility] Health Maintenance Due Topic Date Due  
 DTaP/Tdap/Td series (1 - Tdap) 09/28/1965  Shingrix Vaccine Age 50> (1 of 2) 09/28/1994  Pneumococcal 65+ years (2 of 2 - PPSV23) 01/13/2017  MEDICARE YEARLY EXAM  03/21/2018  
 FOOT EXAM Q1  06/26/2018 [facility]

## 2019-04-02 NOTE — PROGRESS NOTES
Medicare Annual Wellness Visit I have reviewed the patient's medical history in detail and updated the computerized patient record. History Gerber Vincent is a 75 y.o. male who presents follow-up on chronic medical issues. He had labs done last week and I reviewed these with him this visit. His A1c is trending up. 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 He is eating a little bit more and gained about 6 pounds since I saw him 6 months ago He was having some hypotension and after dinner in the last year. This caused us to reduce his blood pressure medication. Was taking lisinoprilHCTZ and Norvasc. Only taking 2.5 mg Norvasc. Blood pressure at home is 254114 systolic. Has peripheral artery disease. Got a good report from vascular. Walks 3.5 miles on the treadmill at the gym the other day without any claudication. Has a sensation of right ear fullness that is intermittent. Seems to be resolved by Q-tip. See exam 
 
Past Medical History:  
Diagnosis Date  Hypercholesterolemia  Hypertension Past Surgical History:  
Procedure Laterality Date  CARDIAC SURG PROCEDURE UNLIST  2003  
 aortobifemoral bypass Current Outpatient Medications Medication Sig Dispense Refill  hydroCHLOROthiazide (HYDRODIURIL) 12.5 mg tablet Take 1 Tab by mouth daily. 90 Tab 3  
 amLODIPine (NORVASC) 2.5 mg tablet Take 1 Tab by mouth daily. 90 Tab 3  pravastatin (PRAVACHOL) 10 mg tablet TAKE ONE TABLET BY MOUTH ONCE DAILY 90 Tab 3  
 metFORMIN (GLUCOPHAGE) 500 mg tablet TAKE 1 TABLET BY MOUTH TWICE DAILY WITH MEALS 180 Tab 3  
 cilostazol (PLETAL) 100 mg tablet TAKE 1 TABLET BY MOUTH TWICE A DAY BEFORE BREAKFAST AND DINNER 180 Tab 3  
 fluticasone (FLONASE) 50 mcg/actuation nasal spray USE TWO SPRAY(S) IN EACH NOSTRIL ONCE DAILY 1 Bottle 3  
 aspirin 81 mg tablet Take 81 mg by mouth.  guaiFENesin ER (MUCINEX) 600 mg ER tablet Take 1 Tab by mouth two (2) times a day. 60 Tab 0 No Known Allergies No family history on file. Social History Tobacco Use  Smoking status: Former Smoker Last attempt to quit: 3/1/2003 Years since quittin.0  Smokeless tobacco: Never Used Substance Use Topics  Alcohol use: Not on file Patient Active Problem List  
Diagnosis Code  Hyperlipidemia E78.5  Hypertension I10  
 PAD (peripheral artery disease) ([facility]) I73.9  
 Enlarged prostate N40.0  Controlled type 2 diabetes mellitus without complication, without long-term current use of insulin ([facility]) E11.9 Depression Risk Factor Screening:  
 
3 most recent PHQ Screens 2019 Little interest or pleasure in doing things Not at all Feeling down, depressed, irritable, or hopeless Not at all Total Score PHQ 2 0 Alcohol Risk Factor Screening: On any occasion during the past 3 months, have you had more than 4 drinks containing alcohol? No 
 
Do you average more than 14 drinks per week? No 
 
 
Functional Ability and Level of Safety:  
 
Hearing Loss  
none Activities of Daily Living Self-care. Requires assistance with: no ADLs Fall Risk Fall Risk Assessment, last 12 mths 2019 Able to walk? Yes Fall in past 12 months? No  
 
Abuse Screen Patient is not abused Review of Systems ROS: 
As listed in HPI. In addition: 
Constitutional:   No headache, fever, fatigue, weight loss or weight gain Eyes:   No redness, pruritis, pain, visual changes, swelling, or discharge Ears:    No pain, loss or changes in hearing Cardiac:    No chest pain Resp:   No cough or shortness of breath Neuro   No loss of consciousness, dizziness, seizure Physical Examination Evaluation of Cognitive Function: 
Mood/affect:  happy Appearance: age appropriate Family member/caregiver input:  
 
Physical Exam: Blood pressure 168/90, pulse 91, temperature 98.2 °F (36.8 °C), temperature source Oral, resp. rate 16, height 6' 1\" (1.854 m), weight 209 lb (94.8 kg), SpO2 94 %. GEN: No apparent distress. Alert and oriented and responds to all questions appropriately. EYES:  Conjunctiva clear; pupils round and reactive to light; extraocular movements are intact. EAR: External ears are normal.  Right ear canal has 2 polyps? Which is deep pain could be pressing on the tympanic membrane. Left has 1 polyp that it is in the middle of the canal 
NOSE: Turbinates are within normal limits. No drainage OROPHYARYNX: No oral lesions or exudates. NECK:  Supple; no masses; thyroid normal          
LUNGS: Respirations unlabored; clear to auscultation bilaterally CARDIOVASCULAR: Regular, rate, and rhythm without murmurs ABDOMEN: Soft; nontender; nondistended; normoactive bowel sounds; no masses or organomegaly NEUROLOGIC:  No focal neurologic deficits. Strength and sensation grossly intact. Coordination and gait grossly intact. EXT: Well perfused. No edema. SKIN: No obvious rashes. Patient Care Team: 
Roshan Rodas MD as PCP - [facility]) Advice/Referrals/Counseling Education and counseling provided: Former smoker, has already gotten the abdominal aorta taking care of, vascular graft. Annual surveillance with vascular surgeon 
  
Has had lung cancer CT once in 2017. No nodules noted. Quit 2003. Outside the screening window 
  
Had a colonoscopy 2014 with Dr. Tripp Ann, track down these records 
  
No family history of prostate cancer but he would like a PSA Got his shingles vaccine last month. Will get booster soon ACP on file Assessment/Plan Peripheral artery disease No claudication Good report from vascular surgeon. Following up annually Hypertension Elevated here in the office, reasonably well controlled on home readings. He is open to a medication change Norvasc 2.5 mg, continue Add HCTZ 12.5 mg 
Try multiple low-dose agents to improve lability Diabetes A1c 6.8. Well controlled but higher than it was this time last year (5.8). 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 Relieved by Q-tip. Please stop this No earwax There is a polyp (not sure what else to call this) it may be propped up against the tympanic membrane. Suggested ENT, he did not send enthusiastic. Would be happy to refer He brought up a tremor that only happens some of the time. 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 Does not affect his writing or any other activity. Asked him to monitor sugar and caffeine intake as possible causes 6-month follow-up ICD-10-CM ICD-9-CM 1. Routine general medical examination at a health care facility Z00.00 V70.0 2. Essential hypertension I10 401.9 amLODIPine (NORVASC) 2.5 mg tablet 3. Hyperlipidemia, unspecified hyperlipidemia type E78.5 272.4 4. Controlled type 2 diabetes mellitus without complication, without long-term current use of insulin ([facility]) E11.9 250.00 5. PAD (peripheral artery disease) ([facility]) I73.9 443.9

## 2019-07-08 ENCOUNTER — TELEPHONE (OUTPATIENT)
Dept: FAMILY MEDICINE CLINIC | Age: 74
End: 2019-07-08

## 2019-07-08 NOTE — TELEPHONE ENCOUNTER
Diarrhea is a side effect of HCTZ. If he feels that HCTZ is causing his diarrhea than he should stop. He should notice improvement within a few days of stopping the medication if it is in fact the medication's fault. Keep track your blood pressure.   We need to make sure it stays below 122 systolic

## 2019-07-08 NOTE — TELEPHONE ENCOUNTER
. Jessee Schirmer said his BP is not over 140/90 and he said he doesn't feel good has leg pain and just not himself is having diarrhea about 5 times a day, he doesn't have energy any more. He doesn't like taking a fluid pill and is concerned the HCTZ is causing all of this.  He said he doesn't drink enough fluids to be on a fluid pill

## 2019-07-09 NOTE — TELEPHONE ENCOUNTER
Mr. Hi Isabella notified Diarrhea is a side effect of HCTZ. If he feels that HCTZ is causing his diarrhea than he should stop. He should notice improvement within a few days of stopping the medication if it is in fact the medication's fault. Keep track your blood pressure. We need to make sure it stays below 275 systolic.   He voiced understanding will stop HCTZ and call if BP goes above 684 systolic

## 2019-12-09 RX ORDER — CILOSTAZOL 100 MG/1
TABLET ORAL
Qty: 180 TAB | Refills: 3 | Status: SHIPPED | OUTPATIENT
Start: 2019-12-09 | End: 2020-11-13 | Stop reason: SDUPTHER

## 2020-03-09 DIAGNOSIS — E78.5 HYPERLIPIDEMIA, UNSPECIFIED HYPERLIPIDEMIA TYPE: ICD-10-CM

## 2020-03-10 RX ORDER — PRAVASTATIN SODIUM 10 MG/1
TABLET ORAL
Qty: 90 TAB | Refills: 3 | Status: SHIPPED | OUTPATIENT
Start: 2020-03-10

## 2020-03-17 ENCOUNTER — TELEPHONE (OUTPATIENT)
Dept: FAMILY MEDICINE CLINIC | Age: 75
End: 2020-03-17

## 2020-03-17 NOTE — TELEPHONE ENCOUNTER
Patient states, \"his pharmacy has not received his medication refill\". I told the patient that it was sent on 3/10/2020 and confirmed by pharmacy on 3/10/2020 @12:48PM.    Patient is requesting for a nurse to call in the prescription to the pharmacy.

## 2020-03-30 ENCOUNTER — TELEPHONE (OUTPATIENT)
Dept: FAMILY MEDICINE CLINIC | Age: 75
End: 2020-03-30

## 2020-03-30 NOTE — TELEPHONE ENCOUNTER
He is due for his checkup in the month of April.   Can make an appointment to see us Wednesday Thursday or Friday or wait until [facility] is up and running

## 2020-03-30 NOTE — TELEPHONE ENCOUNTER
Patient would like to come in to do a yearly physical. I let him know that we are strictly doing virtual visits starting next week and tried to set him up for a virtual visit. he says that he doesn't think that a phone visit would work because he has so many problems and is having trouble urinating.  He can be reached at [phone]

## 2020-03-31 ENCOUNTER — OFFICE VISIT (OUTPATIENT)
Dept: FAMILY MEDICINE CLINIC | Age: 75
End: 2020-03-31

## 2020-03-31 ENCOUNTER — HOSPITAL ENCOUNTER (OUTPATIENT)
Dept: LAB | Age: 75
Discharge: HOME | End: 2020-03-31
Payer: MEDICARE

## 2020-03-31 VITALS
OXYGEN SATURATION: 95 % | RESPIRATION RATE: 16 BRPM | BODY MASS INDEX: 27.86 KG/M2 | SYSTOLIC BLOOD PRESSURE: 187 MMHG | HEIGHT: 73 IN | DIASTOLIC BLOOD PRESSURE: 80 MMHG | WEIGHT: 210.2 LBS | TEMPERATURE: 98.2 F | HEART RATE: 48 BPM

## 2020-03-31 DIAGNOSIS — N40.0 ENLARGED PROSTATE: ICD-10-CM

## 2020-03-31 DIAGNOSIS — E11.9 CONTROLLED TYPE 2 DIABETES MELLITUS WITHOUT COMPLICATION, WITHOUT LONG-TERM CURRENT USE OF INSULIN (HCC): Primary | ICD-10-CM

## 2020-03-31 DIAGNOSIS — E78.41 ELEVATED LIPOPROTEIN(A): ICD-10-CM

## 2020-03-31 DIAGNOSIS — E11.51 TYPE 2 DIABETES MELLITUS WITH PERIPHERAL VASCULAR DISEASE (HCC): ICD-10-CM

## 2020-03-31 DIAGNOSIS — I73.9 PAD (PERIPHERAL ARTERY DISEASE) (HCC): ICD-10-CM

## 2020-03-31 DIAGNOSIS — E11.21 TYPE 2 DIABETES WITH NEPHROPATHY (HCC): ICD-10-CM

## 2020-03-31 DIAGNOSIS — R30.9 URINARY PAIN: ICD-10-CM

## 2020-03-31 DIAGNOSIS — E78.5 HYPERLIPIDEMIA, UNSPECIFIED HYPERLIPIDEMIA TYPE: ICD-10-CM

## 2020-03-31 DIAGNOSIS — G47.19 EXCESSIVE DAYTIME SLEEPINESS: ICD-10-CM

## 2020-03-31 DIAGNOSIS — I10 ESSENTIAL HYPERTENSION: ICD-10-CM

## 2020-03-31 LAB
ALBUMIN UR QL STRIP: 150 MG/L
BILIRUB UR QL STRIP: NEGATIVE
CREATININE, URINE POC: 200 MG/DL
GLUCOSE UR-MCNC: NEGATIVE MG/DL
HBA1C MFR BLD HPLC: 7.2 %
KETONES P FAST UR STRIP-MCNC: NEGATIVE MG/DL
MICROALBUMIN/CREAT RATIO POC: ABNORMAL MG/G
PH UR STRIP: 6 [PH] (ref 4.6–8)
PROT UR QL STRIP: NORMAL
SP GR UR STRIP: 1.03 (ref 1–1.03)
UA UROBILINOGEN AMB POC: NORMAL (ref 0.2–1)
URINALYSIS CLARITY POC: CLEAR
URINALYSIS COLOR POC: YELLOW
URINE BLOOD POC: NEGATIVE
URINE LEUKOCYTES POC: NEGATIVE
URINE NITRITES POC: NEGATIVE

## 2020-03-31 PROCEDURE — 85025 COMPLETE CBC W/AUTO DIFF WBC: CPT

## 2020-03-31 PROCEDURE — 84153 ASSAY OF PSA TOTAL: CPT

## 2020-03-31 PROCEDURE — 80053 COMPREHEN METABOLIC PANEL: CPT

## 2020-03-31 PROCEDURE — 84443 ASSAY THYROID STIM HORMONE: CPT

## 2020-03-31 PROCEDURE — 80061 LIPID PANEL: CPT

## 2020-03-31 PROCEDURE — 36415 COLL VENOUS BLD VENIPUNCTURE: CPT

## 2020-03-31 RX ORDER — TAMSULOSIN HYDROCHLORIDE 0.4 MG/1
0.4 CAPSULE ORAL DAILY
Qty: 90 CAP | Refills: 3 | Status: SHIPPED | OUTPATIENT
Start: 2020-03-31

## 2020-03-31 RX ORDER — METFORMIN HYDROCHLORIDE 500 MG/1
500 TABLET, EXTENDED RELEASE ORAL 2 TIMES DAILY
Qty: 60 TAB | Refills: 11 | Status: SHIPPED | OUTPATIENT
Start: 2020-03-31 | End: 2020-07-29 | Stop reason: SDUPTHER

## 2020-03-31 RX ORDER — LISINOPRIL 5 MG/1
5 TABLET ORAL DAILY
Qty: 90 TAB | Refills: 3 | Status: SHIPPED | OUTPATIENT
Start: 2020-03-31

## 2020-03-31 RX ORDER — FLUTICASONE PROPIONATE 50 MCG
SPRAY, SUSPENSION (ML) NASAL
Qty: 16 G | Refills: 11 | Status: SHIPPED | OUTPATIENT
Start: 2020-03-31

## 2020-03-31 NOTE — PROGRESS NOTES
JOEY  Keri Wilson is a 75 y.o. male who presents to follow-up regarding medical issues. 1 day early for his Medicare wellness visit. Last seen 1 year ago. A1c has been trending up. Going to the gym 3 days out of the week. Not making a particular effort with his diet although does list off several nonsugar things that he has been eating. He was having some problems with hypotension after dinner a few years ago which caused this reduce his blood pressure medication. He is no longer having this issue and is only taking Norvasc 2.5 mg which we left him on for a labile blood pressure. He is checking his blood pressure regularly at home and it is consistently in the 130s. Complaining of some excessive daytime sleepiness that is gotten worse over the last year. Wife thinks he stops breathing at night. 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 He feels like he is falling forward. He has had this problem for a few years. Not particularly worse now. He does not have this problem on level ground. The only medicine he takes with a side effect of dizziness is Pletal and he is not sure there is a correlation but has been on that medicine for so long that he has been taking it the entire time is been dizzy. Having some trouble getting his stream started, having empty his bladder 3 times in a row at 15-minute intervals before he can go 2 or 3 hours before urinating. Only getting up once at night to urinate. Dribbling. Has never tried any medicine for BPH before. Getting a reasonably good report from vascular although needs some updated imaging    PMHx:  Past Medical History:   Diagnosis Date    Hypercholesterolemia     Hypertension        Meds:   Current Outpatient Medications   Medication Sig Dispense Refill    tamsulosin (Flomax) 0.4 mg capsule Take 1 Cap by mouth daily.  90 Cap 3    metFORMIN ER (GLUCOPHAGE XR) 500 mg tablet Take 1 Tab by mouth two (2) times a day. 60 Tab 11    lisinopriL (PRINIVIL, ZESTRIL) 5 mg tablet Take 1 Tab by mouth daily. 90 Tab 3    fluticasone propionate (FLONASE) 50 mcg/actuation nasal spray INSTILL 2 SPRAYS IN EACH NOSTRIL ONCE DAILY 16 g 11    pravastatin (PRAVACHOL) 10 mg tablet Take 1 tablet by mouth once daily 90 Tab 3    cilostazol (PLETAL) 100 mg tablet TAKE 1 TABLET BY MOUTH TWICE A DAY BEFORE BREAKFAST AND DINNER 180 Tab 3    amLODIPine (NORVASC) 2.5 mg tablet Take 1 Tab by mouth daily. 90 Tab 3    guaiFENesin ER (MUCINEX) 600 mg ER tablet Take 1 Tab by mouth two (2) times a day. (Patient taking differently: Take 600 mg by mouth as needed.) 60 Tab 0    hydroCHLOROthiazide (HYDRODIURIL) 12.5 mg tablet Take 1 Tab by mouth daily. 90 Tab 3    aspirin 81 mg tablet Take 81 mg by mouth. Allergies:   No Known Allergies    Smoker:  Social History     Tobacco Use   Smoking Status Former Smoker    Last attempt to quit: 3/1/2003    Years since quittin.0   Smokeless Tobacco Never Used       ETOH:   Social History     Substance and Sexual Activity   Alcohol Use None       FH:   Family History   Problem Relation Age of Onset    Alzheimer Mother     Pneumonia Father        ROS:   As listed in HPI. In addition:  Constitutional:   No headache, fever, fatigue, weight loss or weight gain      Cardiac:    No chest pain      Resp:   No cough or shortness of breath      Neuro   No loss of consciousness, dizziness, seizures      Physical Exam:  Blood pressure 187/80, pulse (!) 48, temperature 98.2 °F (36.8 °C), temperature source Oral, resp. rate 16, height 6' 1\" (1.854 m), weight 210 lb 3.2 oz (95.3 kg), SpO2 95 %. GEN: No apparent distress. Alert and oriented and responds to all questions appropriately. NEUROLOGIC:  No focal neurologic deficits. Strength and sensation grossly intact. Coordination and gait grossly intact. EXT: Well perfused. No edema. SKIN: No obvious rashes.   Lungs clear to auscultation bilaterally  CV regular rate rhythm no murmur         Assessment and Plan     Diabetes  A1c 7.2% up from 6.8%  Microalbuminuriarestart  lisinopril 5 mg  Metformin 500 mg twice daily giving him abdominal side effects. Switch to Metformin  mg twice daily    BPH  Flomax  He is mostly concerned about this as a possible sign of prostate cancer. Provided some reassurance and will check a PSA. Balance problem  Side effect of Pletal?  Does not sound like any of the typical dizziness although may be neurogenic? He does have a tremor in the right hand that does not bother him all the time. Perhaps would benefit from a neurology referral if this continues to be a problem. Excessive daytime sleepiness  Refer for sleep study    Hypertension  Home blood pressure typically 852 systolic  Continue Norvasc 2.5 mg  Lisinopril 5 mg  Reasonably confident with the accuracy of his cuff. History PAD no claudication  Good report vascular surgeon following up annually    Had a colonoscopy this year, recommend a repeat 5 years      ICD-10-CM ICD-9-CM    1. Controlled type 2 diabetes mellitus without complication, without long-term current use of insulin ([facility]) E11.9 250.00 AMB POC HEMOGLOBIN A1C      AMB POC URINE, MICROALBUMIN, SEMIQUANT (3 RESULTS)   2. Urinary pain R30.9 788.1 AMB POC URINALYSIS DIP STICK AUTO W/O MICRO   3. Essential hypertension I10 401.9 CBC WITH AUTOMATED DIFF      METABOLIC PANEL, COMPREHENSIVE      TSH 3RD GENERATION   4. PAD (peripheral artery disease) ([facility]) I73.9 443.9    5. Elevated lipoprotein(a) E78.41 272.8    6. Enlarged prostate N40.0 600.00 PSA, DIAGNOSTIC (PROSTATE SPECIFIC AG)   7. Hyperlipidemia, unspecified hyperlipidemia type E78.5 272.4 LIPID PANEL   8. Type 2 diabetes with nephropathy ([facility]) E11.21 250.40      583.81    9. Type 2 diabetes mellitus with peripheral vascular disease ([facility]) E11.51 250.70      443.81    10.  Excessive daytime sleepiness G47.19 780.54 SLEEP MEDICINE REFERRAL       AVS given. Pt expressed understanding of instructions  This was a 40-minute visit. Greater than 50% of the time was spent counseling the patient on diabetes hypertension BPH hyperlipidemia microalbuminuria.

## 2020-03-31 NOTE — PROGRESS NOTES
1. Have you been to the ER, urgent care clinic since your last visit? Hospitalized since your last visit? No  2. Have you seen or consulted any other health care providers outside of the [address] [place] [address] since your last visit? Include any pap smears or colon screening. Yes, Colonoscopy ([id number])    Health Maintenance Due   Topic Date Due    DTaP/Tdap/Td series (1 - Tdap) 09/28/1965    Shingrix Vaccine Age 50> (1 of 2) 09/28/1994    Pneumococcal 65+ years (2 of 2 - PPSV23) 01/13/2017    Foot Exam Q1  06/26/2018    Influenza Age 9 to Adult  08/01/2019    MICROALBUMIN Q1  09/19/2019    A1C test (Diabetic or Prediabetic)  03/27/2020    Lipid Screen  03/27/2020    Medicare Yearly Exam  04/02/2020     .

## 2020-04-01 LAB
ALBUMIN SERPL-MCNC: 4.3 G/DL (ref 3.7–4.7)
ALBUMIN/GLOB SERPL: 1.8 {RATIO} (ref 1.2–2.2)
ALP SERPL-CCNC: 41 IU/L (ref 39–117)
ALT SERPL-CCNC: 36 IU/L (ref 0–44)
AST SERPL-CCNC: 24 IU/L (ref 0–40)
BASOPHILS # BLD AUTO: 0.1 X10E3/UL (ref 0–0.2)
BASOPHILS NFR BLD AUTO: 1 %
BILIRUB SERPL-MCNC: 0.5 MG/DL (ref 0–1.2)
BUN SERPL-MCNC: 18 MG/DL (ref 8–27)
BUN/CREAT SERPL: 16 (ref 10–24)
CALCIUM SERPL-MCNC: 10.3 MG/DL (ref 8.6–10.2)
CHLORIDE SERPL-SCNC: 100 MMOL/L (ref 96–106)
CHOLEST SERPL-MCNC: 161 MG/DL (ref 100–199)
CO2 SERPL-SCNC: 25 MMOL/L (ref 20–29)
CREAT SERPL-MCNC: 1.11 MG/DL (ref 0.76–1.27)
EOSINOPHIL # BLD AUTO: 0.1 X10E3/UL (ref 0–0.4)
EOSINOPHIL NFR BLD AUTO: 2 %
ERYTHROCYTE [DISTWIDTH] IN BLOOD BY AUTOMATED COUNT: 13.1 % (ref 11.6–15.4)
GLOBULIN SER CALC-MCNC: 2.4 G/DL (ref 1.5–4.5)
GLUCOSE SERPL-MCNC: 125 MG/DL (ref 65–99)
HCT VFR BLD AUTO: 47.1 % (ref 37.5–51)
HDLC SERPL-MCNC: 66 MG/DL
HGB BLD-MCNC: 16.7 G/DL (ref 13–17.7)
IMM GRANULOCYTES # BLD AUTO: 0 X10E3/UL (ref 0–0.1)
IMM GRANULOCYTES NFR BLD AUTO: 0 %
INTERPRETATION, 910389: NORMAL
LDLC SERPL CALC-MCNC: 76 MG/DL (ref 0–99)
LYMPHOCYTES # BLD AUTO: 1.2 X10E3/UL (ref 0.7–3.1)
LYMPHOCYTES NFR BLD AUTO: 21 %
MCH RBC QN AUTO: 33.5 PG (ref 26.6–33)
MCHC RBC AUTO-ENTMCNC: 35.5 G/DL (ref 31.5–35.7)
MCV RBC AUTO: 95 FL (ref 79–97)
MONOCYTES # BLD AUTO: 0.5 X10E3/UL (ref 0.1–0.9)
MONOCYTES NFR BLD AUTO: 9 %
NEUTROPHILS # BLD AUTO: 3.9 X10E3/UL (ref 1.4–7)
NEUTROPHILS NFR BLD AUTO: 67 %
PLATELET # BLD AUTO: 184 X10E3/UL (ref 150–450)
POTASSIUM SERPL-SCNC: 4.2 MMOL/L (ref 3.5–5.2)
PROT SERPL-MCNC: 6.7 G/DL (ref 6–8.5)
PSA SERPL-MCNC: 1.7 NG/ML (ref 0–4)
RBC # BLD AUTO: 4.98 X10E6/UL (ref 4.14–5.8)
SODIUM SERPL-SCNC: 141 MMOL/L (ref 134–144)
TRIGL SERPL-MCNC: 94 MG/DL (ref 0–149)
TSH SERPL DL<=0.005 MIU/L-ACNC: 1.74 UIU/ML (ref 0.45–4.5)
VLDLC SERPL CALC-MCNC: 19 MG/DL (ref 5–40)
WBC # BLD AUTO: 5.8 X10E3/UL (ref 3.4–10.8)

## 2020-04-03 DIAGNOSIS — I10 ESSENTIAL HYPERTENSION: ICD-10-CM

## 2020-04-03 RX ORDER — AMLODIPINE BESYLATE 2.5 MG/1
2.5 TABLET ORAL DAILY
Qty: 90 TAB | Refills: 3 | Status: SHIPPED | OUTPATIENT
Start: 2020-04-03

## 2020-04-03 NOTE — TELEPHONE ENCOUNTER
Requested Prescriptions     Pending Prescriptions Disp Refills    amLODIPine (NORVASC) 2.5 mg tablet 90 Tab 3     Sig: Take 1 Tab by mouth daily.

## 2020-06-19 ENCOUNTER — HOSPITAL ENCOUNTER (OUTPATIENT)
Dept: CT IMAGING | Age: 75
Discharge: HOME | End: 2020-06-19
Attending: SURGERY
Payer: MEDICARE

## 2020-06-19 DIAGNOSIS — I73.9 PAD (PERIPHERAL ARTERY DISEASE) (HCC): ICD-10-CM

## 2020-06-19 PROCEDURE — 74011636320 HC RX REV CODE- 636/320: Performed by: SURGERY

## 2020-06-19 PROCEDURE — 75635 CT ANGIO ABDOMINAL ARTERIES: CPT

## 2020-06-19 RX ORDER — SODIUM CHLORIDE 0.9 % (FLUSH) 0.9 %
10 SYRINGE (ML) INJECTION
Status: COMPLETED | OUTPATIENT
Start: 2020-06-19 | End: 2020-06-19

## 2020-06-19 RX ADMIN — IOPAMIDOL 100 ML: 755 INJECTION, SOLUTION INTRAVENOUS at 09:46

## 2020-06-19 RX ADMIN — Medication 10 ML: at 09:46

## 2020-07-29 RX ORDER — METFORMIN HYDROCHLORIDE 500 MG/1
500 TABLET, EXTENDED RELEASE ORAL 2 TIMES DAILY
Qty: 180 TAB | Refills: 0 | Status: SHIPPED | OUTPATIENT
Start: 2020-07-29

## 2020-07-29 NOTE — TELEPHONE ENCOUNTER
[facility] is requesting a 90 day supply on       Requested Prescriptions     Pending Prescriptions Disp Refills    metFORMIN ER (GLUCOPHAGE XR) 500 mg tablet 60 Tab 11     Sig: Take 1 Tab by mouth two (2) times a day.

## 2020-11-13 RX ORDER — CILOSTAZOL 100 MG/1
TABLET ORAL
Qty: 180 TAB | Refills: 3 | Status: SHIPPED | OUTPATIENT
Start: 2020-11-13

## 2022-03-18 PROBLEM — E11.21 TYPE 2 DIABETES WITH NEPHROPATHY (HCC): Status: ACTIVE | Noted: 2020-03-31

## 2022-03-19 PROBLEM — E11.9 CONTROLLED TYPE 2 DIABETES MELLITUS WITHOUT COMPLICATION, WITHOUT LONG-TERM CURRENT USE OF INSULIN (HCC): Status: ACTIVE | Noted: 2017-03-21

## 2022-03-19 PROBLEM — E11.51 TYPE 2 DIABETES MELLITUS WITH PERIPHERAL VASCULAR DISEASE (HCC): Status: ACTIVE | Noted: 2020-03-31

## 2022-06-24 ENCOUNTER — TRANSCRIBE ORDER (OUTPATIENT)
Dept: SCHEDULING | Age: 77
End: 2022-06-24

## 2022-06-24 DIAGNOSIS — I71.40 ABDOMINAL AORTIC ANEURYSM: Primary | ICD-10-CM

## 2023-01-12 ENCOUNTER — TRANSCRIBE ORDER (OUTPATIENT)
Dept: SCHEDULING | Age: 78
End: 2023-01-12

## 2023-01-12 DIAGNOSIS — I71.40 ABDOMINAL AORTIC ANEURYSM: Primary | ICD-10-CM

## 2023-05-08 ENCOUNTER — TRANSCRIBE ORDERS (OUTPATIENT)
Facility: HOSPITAL | Age: 78
End: 2023-05-08

## 2023-05-08 DIAGNOSIS — I71.40 ABDOMINAL AORTIC ANEURYSM (AAA) WITHOUT RUPTURE, UNSPECIFIED PART (HCC): Primary | ICD-10-CM

## 2023-06-22 ENCOUNTER — HOSPITAL ENCOUNTER (OUTPATIENT)
Facility: HOSPITAL | Age: 78
Discharge: HOME | End: 2023-06-22
Payer: MEDICARE

## 2023-06-22 DIAGNOSIS — I71.40 ABDOMINAL AORTIC ANEURYSM (AAA) WITHOUT RUPTURE, UNSPECIFIED PART (HCC): ICD-10-CM

## 2023-06-22 LAB — CREAT BLD-MCNC: 1 MG/DL (ref 0.6–1.3)

## 2023-06-22 PROCEDURE — 82565 ASSAY OF CREATININE: CPT

## 2023-06-22 PROCEDURE — 74174 CTA ABD&PLVS W/CONTRAST: CPT

## 2023-06-22 PROCEDURE — 6360000004 HC RX CONTRAST MEDICATION: Performed by: FAMILY MEDICINE

## 2023-06-22 RX ADMIN — IOPAMIDOL 100 ML: 755 INJECTION, SOLUTION INTRAVENOUS at 11:47

## 2023-09-13 NOTE — TELEPHONE ENCOUNTER
Chief Complaint   Patient presents with    Medication Refill     Last refill:   4 weeks ago (12/31/2018)   amLODIPine (NORVASC) 2.5 mg tablet   Take 1 Tab by mouth daily. Dispense: 30 Tab     Refills: 0     Start: 12/31/2018     By: Prabhu Osorio MD      Last Ov: 12/31/18  Located within [facility]. show